# Patient Record
Sex: FEMALE | Race: BLACK OR AFRICAN AMERICAN
[De-identification: names, ages, dates, MRNs, and addresses within clinical notes are randomized per-mention and may not be internally consistent; named-entity substitution may affect disease eponyms.]

---

## 2017-01-24 ENCOUNTER — HOSPITAL ENCOUNTER (EMERGENCY)
Dept: HOSPITAL 62 - ER | Age: 32
Discharge: LEFT BEFORE DISCHARGE | End: 2017-01-24
Payer: MEDICAID

## 2017-01-24 VITALS — DIASTOLIC BLOOD PRESSURE: 110 MMHG | SYSTOLIC BLOOD PRESSURE: 164 MMHG

## 2017-01-24 DIAGNOSIS — R19.7: ICD-10-CM

## 2017-01-24 DIAGNOSIS — R51: ICD-10-CM

## 2017-01-24 DIAGNOSIS — R50.9: ICD-10-CM

## 2017-01-24 DIAGNOSIS — Z53.9: Primary | ICD-10-CM

## 2017-01-24 DIAGNOSIS — R11.10: ICD-10-CM

## 2017-01-24 LAB
ALBUMIN SERPL-MCNC: 5.2 G/DL (ref 3.5–5)
ALP SERPL-CCNC: 57 U/L (ref 38–126)
ALT SERPL-CCNC: 37 U/L (ref 9–52)
ANION GAP SERPL CALC-SCNC: 15 MMOL/L (ref 5–19)
APPEARANCE UR: (no result)
AST SERPL-CCNC: 26 U/L (ref 14–36)
BASE EXCESS BLDV CALC-SCNC: 0.8 MMOL/L
BASOPHILS # BLD AUTO: 0 10^3/UL (ref 0–0.2)
BASOPHILS NFR BLD AUTO: 0.4 % (ref 0–2)
BILIRUB DIRECT SERPL-MCNC: 0 MG/DL (ref 0–0.3)
BILIRUB SERPL-MCNC: 0.8 MG/DL (ref 0.2–1.3)
BILIRUB UR QL STRIP: NEGATIVE
BUN SERPL-MCNC: 9 MG/DL (ref 7–20)
CALCIUM: 10 MG/DL (ref 8.4–10.2)
CHLORIDE SERPL-SCNC: 98 MMOL/L (ref 98–107)
CO2 SERPL-SCNC: 27 MMOL/L (ref 22–30)
CREAT SERPL-MCNC: 1.07 MG/DL (ref 0.52–1.25)
EOSINOPHIL # BLD AUTO: 0 10^3/UL (ref 0–0.6)
EOSINOPHIL NFR BLD AUTO: 0 % (ref 0–6)
ERYTHROCYTE [DISTWIDTH] IN BLOOD BY AUTOMATED COUNT: 12.9 % (ref 11.5–14)
GLUCOSE SERPL-MCNC: 86 MG/DL (ref 75–110)
GLUCOSE UR STRIP-MCNC: NEGATIVE MG/DL
HCO3 BLDV-SCNC: 26.4 MMOL/L (ref 20–32)
HCT VFR BLD CALC: 45.2 % (ref 36–47)
HGB BLD-MCNC: 15 G/DL (ref 12–15.5)
HGB HCT DIFFERENCE: -0.2
KETONES UR STRIP-MCNC: (no result) MG/DL
LYMPHOCYTES # BLD AUTO: 0.7 10^3/UL (ref 0.5–4.7)
LYMPHOCYTES NFR BLD AUTO: 12.4 % (ref 13–45)
MCH RBC QN AUTO: 31.1 PG (ref 27–33.4)
MCHC RBC AUTO-ENTMCNC: 33.2 G/DL (ref 32–36)
MCV RBC AUTO: 94 FL (ref 80–97)
MONOCYTES # BLD AUTO: 0.8 10^3/UL (ref 0.1–1.4)
MONOCYTES NFR BLD AUTO: 13.6 % (ref 3–13)
NEUTROPHILS # BLD AUTO: 4.1 10^3/UL (ref 1.7–8.2)
NEUTS SEG NFR BLD AUTO: 73.6 % (ref 42–78)
NITRITE UR QL STRIP: NEGATIVE
PCO2 BLDV: 45.6 MMHG (ref 35–63)
PH BLDV: 7.38 [PH] (ref 7.3–7.42)
PH UR STRIP: 5 [PH] (ref 5–9)
POTASSIUM SERPL-SCNC: 4.1 MMOL/L (ref 3.6–5)
PROT SERPL-MCNC: 8.9 G/DL (ref 6.3–8.2)
PROT UR STRIP-MCNC: 30 MG/DL
PROTHROMBIN TIME: 12.5 SEC (ref 11.4–15.4)
RBC # BLD AUTO: 4.82 10^6/UL (ref 3.72–5.28)
SODIUM SERPL-SCNC: 140.1 MMOL/L (ref 137–145)
SP GR UR STRIP: 1.03
UROBILINOGEN UR-MCNC: NEGATIVE MG/DL (ref ?–2)
WBC # BLD AUTO: 5.6 10^3/UL (ref 4–10.5)

## 2017-01-24 PROCEDURE — 36415 COLL VENOUS BLD VENIPUNCTURE: CPT

## 2017-01-24 PROCEDURE — 82962 GLUCOSE BLOOD TEST: CPT

## 2017-01-24 PROCEDURE — 93010 ELECTROCARDIOGRAM REPORT: CPT

## 2017-01-24 PROCEDURE — 71020: CPT

## 2017-01-24 PROCEDURE — 99281 EMR DPT VST MAYX REQ PHY/QHP: CPT

## 2017-01-24 PROCEDURE — 93005 ELECTROCARDIOGRAM TRACING: CPT

## 2017-01-24 PROCEDURE — 85610 PROTHROMBIN TIME: CPT

## 2017-01-24 PROCEDURE — 81001 URINALYSIS AUTO W/SCOPE: CPT

## 2017-01-24 PROCEDURE — 82803 BLOOD GASES ANY COMBINATION: CPT

## 2017-01-24 PROCEDURE — 87086 URINE CULTURE/COLONY COUNT: CPT

## 2017-01-24 PROCEDURE — 87040 BLOOD CULTURE FOR BACTERIA: CPT

## 2017-01-24 PROCEDURE — 80053 COMPREHEN METABOLIC PANEL: CPT

## 2017-01-24 PROCEDURE — 85025 COMPLETE CBC W/AUTO DIFF WBC: CPT

## 2017-01-24 PROCEDURE — 83605 ASSAY OF LACTIC ACID: CPT

## 2017-01-24 PROCEDURE — 84703 CHORIONIC GONADOTROPIN ASSAY: CPT

## 2017-01-24 NOTE — ER DOCUMENT REPORT
ED Medical Screen (RME)





- General


Chief Complaint: Fever


Stated Complaint: FEVER,VOMITING,DIARRHEA,HEADACHES


Time seen by provider: 18:57


Mode of Arrival: Ambulatory


Information source: Patient


Notes: 


31-year-old female presents to ED for nausea vomiting diarrhea fever bodyaches 

headache and back pain since Friday.  States she laid down most the time Friday 

night and Saturday all day.  Patient has a elevated blood pressure states she 

has a history of high blood pressure.  She also has a temp of 102.6 in RME














I have greeted and performed a rapid initial assessment of this patient.  A 

comprehensive ED assessment and evaluation of the patient, analysis of test 

results and completion of medical decision making process will be conducted by 

an additional ED providers.


TRAVEL OUTSIDE OF THE [place] IN LAST 30 DAYS: No





- Related Data


Allergies/Adverse Reactions: 


 





latex Allergy (Verified 05/24/16 12:36)


 


Penicillins Allergy (Verified 05/24/16 12:36)


 











Past Medical History





- Past Medical History


Cardiac Medical History: Reports: Hx Hypertension


Neurological Medical History: Reports: Hx Seizures - last 2 weeks





- Immunizations


Immunizations up to date: Yes


Hx Diphtheria, Pertussis, Tetanus Vaccination: Yes

## 2017-01-25 NOTE — EKG REPORT
SEVERITY:- BORDERLINE ECG -

SINUS RHYTHM

PROBABLE LEFT ATRIAL ABNORMALITY

:

Confirmed by: Loly Nice MD 25-Jan-2017 14:56:39

## 2017-06-25 ENCOUNTER — HOSPITAL ENCOUNTER (EMERGENCY)
Dept: HOSPITAL 62 - ER | Age: 32
Discharge: HOME | End: 2017-06-25
Payer: MEDICAID

## 2017-06-25 VITALS — DIASTOLIC BLOOD PRESSURE: 105 MMHG | SYSTOLIC BLOOD PRESSURE: 149 MMHG

## 2017-06-25 DIAGNOSIS — M54.89: ICD-10-CM

## 2017-06-25 DIAGNOSIS — T14.8: ICD-10-CM

## 2017-06-25 DIAGNOSIS — O99.89: ICD-10-CM

## 2017-06-25 DIAGNOSIS — O9A.211: Primary | ICD-10-CM

## 2017-06-25 DIAGNOSIS — O99.331: ICD-10-CM

## 2017-06-25 DIAGNOSIS — O26.891: ICD-10-CM

## 2017-06-25 DIAGNOSIS — Z3A.09: ICD-10-CM

## 2017-06-25 DIAGNOSIS — O16.1: ICD-10-CM

## 2017-06-25 DIAGNOSIS — R10.30: ICD-10-CM

## 2017-06-25 LAB
APPEARANCE UR: (no result)
BILIRUB UR QL STRIP: NEGATIVE
GLUCOSE UR STRIP-MCNC: NEGATIVE MG/DL
KETONES UR STRIP-MCNC: NEGATIVE MG/DL
NITRITE UR QL STRIP: NEGATIVE
PH UR STRIP: 6 [PH] (ref 5–9)
PROT UR STRIP-MCNC: NEGATIVE MG/DL
SP GR UR STRIP: 1.03
UROBILINOGEN UR-MCNC: NEGATIVE MG/DL (ref ?–2)

## 2017-06-25 PROCEDURE — 81025 URINE PREGNANCY TEST: CPT

## 2017-06-25 PROCEDURE — 99283 EMERGENCY DEPT VISIT LOW MDM: CPT

## 2017-06-25 PROCEDURE — 81001 URINALYSIS AUTO W/SCOPE: CPT

## 2017-06-25 NOTE — ER DOCUMENT REPORT
HPI





- HPI


Pain Level: 3


Context: 


33 yo female presents to ED with 2 complaints.  


1.pain to right upper back x 2 days.  has had similar pain intermittantly x 6-8 

mos.  no trauma, + increased stress recently.  no radiculpathy, paresthesia, 

fever. 


2.  lower abdominal pain x 1 month.  dull, intermittant.  denies dysuria, 

vaginal pain or discharge.  no fever 


Associated Symptoms: Nausea


Exacerbated by: Movement


Relieved by: Denies


Similar symptoms previously: Yes





- ROS


Systems Reviewed and Negative: Yes All other systems reviewed and negative





- DERM


Skin Color: Normal





Past Medical History





- General


Information source: Patient





- Social History


Smoking Status: Current Every Day Smoker


Frequency of alcohol use: None


Lives with: Family


Family History: Arthritis, CAD, CVA, DM, Hyperlipidemia, Hypertension, 

Malignancy.  denies: COPD, Thyroid Disfunction


Patient has suicidal ideation: No


Patient has homicidal ideation: No





- Past Medical History


Cardiac Medical History: Reports: Hx Hypertension


Neurological Medical History: Reports: Hx Seizures - last 2 weeks


Renal/ Medical History: Denies: Hx Peritoneal Dialysis





- Immunizations


Immunizations up to date: Yes


Hx Diphtheria, Pertussis, Tetanus Vaccination: Yes





Vertical Provider Document





- CONSTITUTIONAL


Agree With Documented VS: Yes


Exam Limitations: No Limitations





- INFECTION CONTROL


TRAVEL OUTSIDE OF THE [place] IN LAST 30 DAYS: No





- HEENT


HEENT: Atraumatic, Normal ENT Exam, PERRLA





- NECK


Neck: Supple, Other - + right trapezius tenderness





- RESPIRATORY


Respiratory: Breath Sounds Normal, No Respiratory Distress


O2 Sat by Pulse Oximetry: 98





- CARDIOVASCULAR


Cardiovascular: Regular Rate, Regular Rhythm





- GI/ABDOMEN


Gastrointestinal: Abdomen Soft, Abdomen Non-Tender, Normal Bowel Sounds.  

negative: Abdominal Guarding, Abdominal Rebound





- BACK


Notes: 


+ focal tenderness right periscapular area, + muscle cord palpated.





- MUSCULOSKELETAL/EXTREMETIES


Musculoskeletal/Extremeties: MAEW, FROM, Non-Tender





- NEURO


Level of Consciousness: Awake, Alert, Appropriate





Course





- Re-evaluation


Re-evalutation: 





06/25/17 17:43


pregnancy test is postitve


pt aware of results





BP is noted to be elevated today.  + hx/o HTN.  denies any chest pain, 

shortness of breath, headache or dizziness.





- Vital Signs


Vital signs: 


 











Temp Pulse Resp BP Pulse Ox


 


 98.1 F   92   16   149/105 H  98 


 


 06/25/17 13:55  06/25/17 13:55  06/25/17 13:55  06/25/17 13:55  06/25/17 13:55














Discharge





- Discharge


Clinical Impression: 


 Muscle strain, Elevated blood pressure reading





Pregnancy


Qualifiers:


 Weeks of gestation: 9 weeks Qualified Code(s): Z3A.09 - 9 weeks gestation of 

pregnancy





Condition: Stable


Disposition: HOME, SELF-CARE


Instructions:  Ice Packs ([facility]), Warm Packs ([facility]), Muscle Strain ([facility]), 

Pregnancy ([facility])


Additional Instructions: 


Your pregnancy test is postive


please follow up with your primary care for an OB referral for prenatal care


rest and hydrate





Your back pain is coming from a muscle strain


recommend heat, massage and gentle stretches as demonstrated


Forms:  Elevated Blood Pressure

## 2017-07-06 ENCOUNTER — HOSPITAL ENCOUNTER (EMERGENCY)
Dept: HOSPITAL 62 - ER | Age: 32
Discharge: LEFT BEFORE DISCHARGE | End: 2017-07-06
Payer: MEDICAID

## 2017-07-06 VITALS — DIASTOLIC BLOOD PRESSURE: 101 MMHG | SYSTOLIC BLOOD PRESSURE: 187 MMHG

## 2017-07-06 DIAGNOSIS — Z53.9: Primary | ICD-10-CM

## 2017-07-06 DIAGNOSIS — R53.1: ICD-10-CM

## 2017-11-20 ENCOUNTER — HOSPITAL ENCOUNTER (OUTPATIENT)
Dept: HOSPITAL 62 - LC | Age: 32
Discharge: HOME | End: 2017-11-20
Attending: OBSTETRICS & GYNECOLOGY
Payer: MEDICAID

## 2017-11-20 DIAGNOSIS — Z3A.24: ICD-10-CM

## 2017-11-20 DIAGNOSIS — O11.2: Primary | ICD-10-CM

## 2017-11-20 LAB
ALBUMIN SERPL-MCNC: 3.3 G/DL (ref 3.5–5)
ALP SERPL-CCNC: 38 U/L (ref 38–126)
ALT SERPL-CCNC: 25 U/L (ref 9–52)
ANION GAP SERPL CALC-SCNC: 13 MMOL/L (ref 5–19)
APPEARANCE UR: CLEAR
AST SERPL-CCNC: 13 U/L (ref 14–36)
BARBITURATES UR QL SCN: NEGATIVE
BASOPHILS # BLD AUTO: 0 10^3/UL (ref 0–0.2)
BASOPHILS NFR BLD AUTO: 0.4 % (ref 0–2)
BILIRUB DIRECT SERPL-MCNC: 0.2 MG/DL (ref 0–0.4)
BILIRUB SERPL-MCNC: 0.4 MG/DL (ref 0.2–1.3)
BILIRUB UR QL STRIP: NEGATIVE
BUN SERPL-MCNC: 5 MG/DL (ref 7–20)
CALCIUM: 8.7 MG/DL (ref 8.4–10.2)
CHLORIDE SERPL-SCNC: 106 MMOL/L (ref 98–107)
CO2 SERPL-SCNC: 21 MMOL/L (ref 22–30)
CREAT SERPL-MCNC: 0.69 MG/DL (ref 0.52–1.25)
CREAT UR-MCNC: 269.2 MG/DL (ref 16–327)
EOSINOPHIL # BLD AUTO: 0 10^3/UL (ref 0–0.6)
EOSINOPHIL NFR BLD AUTO: 0.4 % (ref 0–6)
ERYTHROCYTE [DISTWIDTH] IN BLOOD BY AUTOMATED COUNT: 12.6 % (ref 11.5–14)
GLUCOSE SERPL-MCNC: 118 MG/DL (ref 75–110)
GLUCOSE UR STRIP-MCNC: NEGATIVE MG/DL
HCT VFR BLD CALC: 31.8 % (ref 36–47)
HGB BLD-MCNC: 10.8 G/DL (ref 12–15.5)
HGB HCT DIFFERENCE: 0.6
KETONES UR STRIP-MCNC: NEGATIVE MG/DL
LDH1 SERPL-CCNC: 316 U/L (ref 313–618)
LYMPHOCYTES # BLD AUTO: 1.1 10^3/UL (ref 0.5–4.7)
LYMPHOCYTES NFR BLD AUTO: 16.6 % (ref 13–45)
MCH RBC QN AUTO: 31.2 PG (ref 27–33.4)
MCHC RBC AUTO-ENTMCNC: 33.9 G/DL (ref 32–36)
MCV RBC AUTO: 92 FL (ref 80–97)
METHADONE UR QL SCN: NEGATIVE
MONOCYTES # BLD AUTO: 0.5 10^3/UL (ref 0.1–1.4)
MONOCYTES NFR BLD AUTO: 7.7 % (ref 3–13)
NEUTROPHILS # BLD AUTO: 4.9 10^3/UL (ref 1.7–8.2)
NEUTS SEG NFR BLD AUTO: 74.9 % (ref 42–78)
NITRITE UR QL STRIP: NEGATIVE
PCP UR QL SCN: NEGATIVE
PH UR STRIP: 5 [PH] (ref 5–9)
POTASSIUM SERPL-SCNC: 3.4 MMOL/L (ref 3.6–5)
PROT SERPL-MCNC: 6 G/DL (ref 6.3–8.2)
PROT UR STRIP-MCNC: 9 MG/DL (ref ?–12)
PROT UR STRIP-MCNC: NEGATIVE MG/DL
RBC # BLD AUTO: 3.46 10^6/UL (ref 3.72–5.28)
SODIUM SERPL-SCNC: 139.7 MMOL/L (ref 137–145)
SP GR UR STRIP: 1.02
URATE SERPL-MCNC: 4.3 MG/DL (ref 2.5–6.2)
URINE OPIATES LOW: NEGATIVE
UROBILINOGEN UR-MCNC: NEGATIVE MG/DL (ref ?–2)
WBC # BLD AUTO: 6.5 10^3/UL (ref 4–10.5)

## 2017-11-20 PROCEDURE — 36415 COLL VENOUS BLD VENIPUNCTURE: CPT

## 2017-11-20 PROCEDURE — 82570 ASSAY OF URINE CREATININE: CPT

## 2017-11-20 PROCEDURE — 80307 DRUG TEST PRSMV CHEM ANLYZR: CPT

## 2017-11-20 PROCEDURE — 80053 COMPREHEN METABOLIC PANEL: CPT

## 2017-11-20 PROCEDURE — 83615 LACTATE (LD) (LDH) ENZYME: CPT

## 2017-11-20 PROCEDURE — 85025 COMPLETE CBC W/AUTO DIFF WBC: CPT

## 2017-11-20 PROCEDURE — 84156 ASSAY OF PROTEIN URINE: CPT

## 2017-11-20 PROCEDURE — 84550 ASSAY OF BLOOD/URIC ACID: CPT

## 2017-11-20 PROCEDURE — 81001 URINALYSIS AUTO W/SCOPE: CPT

## 2017-12-14 ENCOUNTER — HOSPITAL ENCOUNTER (OUTPATIENT)
Dept: HOSPITAL 62 - LC | Age: 32
LOS: 1 days | Discharge: HOME | End: 2017-12-15
Attending: OBSTETRICS & GYNECOLOGY
Payer: MEDICAID

## 2017-12-14 DIAGNOSIS — Z3A.28: ICD-10-CM

## 2017-12-14 DIAGNOSIS — O36.8130: Primary | ICD-10-CM

## 2017-12-14 PROCEDURE — 4A1HXCZ MONITORING OF PRODUCTS OF CONCEPTION, CARDIAC RATE, EXTERNAL APPROACH: ICD-10-PCS | Performed by: OBSTETRICS & GYNECOLOGY

## 2017-12-14 PROCEDURE — 80307 DRUG TEST PRSMV CHEM ANLYZR: CPT

## 2017-12-14 PROCEDURE — 81001 URINALYSIS AUTO W/SCOPE: CPT

## 2017-12-14 PROCEDURE — 59899 UNLISTED PX MAT CARE&DLVR: CPT

## 2017-12-15 ENCOUNTER — HOSPITAL ENCOUNTER (EMERGENCY)
Dept: HOSPITAL 62 - ER | Age: 32
Discharge: HOME | End: 2017-12-15
Payer: MEDICAID

## 2017-12-15 VITALS — SYSTOLIC BLOOD PRESSURE: 170 MMHG | DIASTOLIC BLOOD PRESSURE: 106 MMHG

## 2017-12-15 DIAGNOSIS — Z87.891: ICD-10-CM

## 2017-12-15 DIAGNOSIS — K08.89: Primary | ICD-10-CM

## 2017-12-15 DIAGNOSIS — Z3A.28: ICD-10-CM

## 2017-12-15 DIAGNOSIS — K02.9: ICD-10-CM

## 2017-12-15 LAB
APPEARANCE UR: CLEAR
BARBITURATES UR QL SCN: NEGATIVE
BILIRUB UR QL STRIP: NEGATIVE
GLUCOSE UR STRIP-MCNC: NEGATIVE MG/DL
KETONES UR STRIP-MCNC: NEGATIVE MG/DL
METHADONE UR QL SCN: NEGATIVE
NITRITE UR QL STRIP: NEGATIVE
PCP UR QL SCN: NEGATIVE
PH UR STRIP: 6 [PH] (ref 5–9)
PROT UR STRIP-MCNC: NEGATIVE MG/DL
SP GR UR STRIP: 1.01
URINE OPIATES LOW: (no result)
UROBILINOGEN UR-MCNC: NEGATIVE MG/DL (ref ?–2)

## 2017-12-15 PROCEDURE — 99282 EMERGENCY DEPT VISIT SF MDM: CPT

## 2017-12-15 NOTE — ER DOCUMENT REPORT
ED General





- General


Chief Complaint: Toothache


Stated Complaint: TOOTHACHE


Time Seen by Provider: 12/15/17 01:42


Mode of Arrival: Ambulatory


Information source: Patient


TRAVEL OUTSIDE OF THE [place] IN LAST 30 DAYS: No





- HPI


Onset: Other - months. Pt. has been referred to a surgeon for tooth extraction 

and has follow up next month.


Onset/Duration: Waxing and waning


Quality of pain: Dull, Throbbing


Severity: Severe


Associated symptoms: None


Exacerbated by: Food


Relieved by: Denies, Other - took motrin and tylenol with codeine without relief





- Related Data


Allergies/Adverse Reactions: 


 





Penicillins Allergy (Severe, Verified 12/14/17 23:21)


 Anaphylaxis


latex Allergy (Intermediate, Verified 12/14/17 23:21)


 Hives











Past Medical History





- General


Information source: Patient





- Social History


Smoking Status: Former Smoker


Frequency of alcohol use: None


Drug Abuse: None


Lives with: Family


Family History: Arthritis, CAD, CVA, DM, Hyperlipidemia, Hypertension, 

Malignancy.  denies: COPD, Thyroid Disfunction


Patient has suicidal ideation: No


Patient has homicidal ideation: No





- Medical History


Notes: 





27 weeks pregnant





- Past Medical History


Cardiac Medical History: Reports: Hx Hypertension


Pulmonary Medical History: Reports: None


EENT Medical History: Reports: None


Neurological Medical History: Reports: Hx Seizures - last 2 weeks


Endocrine Medical History: Reports: None


Renal/ Medical History: Denies: Hx Peritoneal Dialysis


Malignancy Medical History: Reports: None


GI Medical History: Reports: None


Musculoskeltal Medical History: Reports None


Skin Medical History: Reports None


Psychiatric Medical History: Reports: None


Traumatic Medical History: Reports: None


Infectious Medical History: Reports: None


Past Surgical History: Reports: None





- Immunizations


Immunizations up to date: Yes


Hx Diphtheria, Pertussis, Tetanus Vaccination: Yes





Review of Systems





- Review of Systems


EENT: No symptoms reported


Cardiovascular: No symptoms reported


Respiratory: No symptoms reported


Gastrointestinal: No symptoms reported


Genitourinary: No symptoms reported


Female Genitourinary: No symptoms reported


Musculoskeletal: No symptoms reported


Neurological/Psychological: No symptoms reported





Physical Exam





- Vital signs


Vitals: 


 











Temp Pulse Resp BP Pulse Ox


 


 98.6 F   84   18   186/109 H  98 


 


 12/15/17 01:02  12/15/17 01:02  12/15/17 01:02  12/15/17 01:02  12/15/17 01:02











Interpretation: Hypertensive





- Notes


Notes: 





PHYSICAL EXAMINATION:





GENERAL: Well-appearing, well-nourished in mild distress due to tooth pain..





HEAD: Atraumatic, normocephalic.





EYES: Pupils equal round and reactive to light, extraocular movements intact, 

conjunctiva are normal.





ENT: Nares patent, oropharynx clear without exudates.  Moist mucous membranes. 

right lower posterior molar with decay and erythematous surrounding gingiva.





NECK: Normal range of motion, supple without lymphadenopathy





LUNGS: Breath sounds clear to auscultation bilaterally and equal.  No wheezes 

rales or rhonchi.





HEART: Regular rate and rhythm without murmurs





ABDOMEN: Gravid. Nontender.





Female : deferred





Musculoskeletal: Normal range of motion, no pitting or edema.  No cyanosis.





NEUROLOGICAL: Cranial nerves grossly intact.  Normal speech, normal gait.  

Normal sensory, motor exams





PSYCH: Normal mood, normal affect.





SKIN: Warm, Dry, normal turgor, no rashes or lesions noted.





Course





- Re-evaluation


Re-evalutation: 





12/15/17 02:00


Pt. was seen in OB and evaluated prior to being seen in ED.





- Vital Signs


Vital signs: 


 











Temp Pulse Resp BP Pulse Ox


 


 98.6 F   84   18   185/109 H  98 


 


 12/15/17 01:08  12/15/17 01:08  12/15/17 01:02  12/15/17 01:08  12/15/17 01:08














Discharge





- Discharge


Clinical Impression: 


 Toothache, Tooth decay, Pregnancy





Condition: Stable


Disposition: HOME, SELF-CARE


Additional Instructions: 


Call your OB tomorrow to discuss pain control options with her.  Also call your 

dentist for follow-up, see if he can see you sooner than your appointment which 

is next month.  Return to the emergency department if you have any fevers.  I 

am not going to place her on antibiotics since you have just finished an 

extended course.  Please be careful with over-the-counter medications as we 

discussed.  Orajel contains benzocaine and that can be harmful to your baby.


Referrals: 


JESÚS JENNINGS MD [Primary Care Provider] - Follow up as needed

## 2018-01-15 ENCOUNTER — HOSPITAL ENCOUNTER (OUTPATIENT)
Dept: HOSPITAL 62 - LC | Age: 33
Discharge: HOME | End: 2018-01-15
Attending: OBSTETRICS & GYNECOLOGY
Payer: MEDICAID

## 2018-01-15 DIAGNOSIS — Z3A.32: ICD-10-CM

## 2018-01-15 DIAGNOSIS — O16.3: ICD-10-CM

## 2018-01-15 DIAGNOSIS — O47.03: Primary | ICD-10-CM

## 2018-01-15 LAB
ADD MANUAL DIFF: NO
ALBUMIN SERPL-MCNC: 3.3 G/DL (ref 3.5–5)
ALP SERPL-CCNC: 55 U/L (ref 38–126)
ALT SERPL-CCNC: 14 U/L (ref 9–52)
ANION GAP SERPL CALC-SCNC: 10 MMOL/L (ref 5–19)
APPEARANCE UR: (no result)
APTT PPP: YELLOW S
AST SERPL-CCNC: 15 U/L (ref 14–36)
BARBITURATES UR QL SCN: NEGATIVE
BASOPHILS # BLD AUTO: 0 10^3/UL (ref 0–0.2)
BASOPHILS NFR BLD AUTO: 0.3 % (ref 0–2)
BILIRUB DIRECT SERPL-MCNC: 0.2 MG/DL (ref 0–0.4)
BILIRUB SERPL-MCNC: 0.4 MG/DL (ref 0.2–1.3)
BILIRUB UR QL STRIP: NEGATIVE
BUN SERPL-MCNC: 7 MG/DL (ref 7–20)
CALCIUM: 9.2 MG/DL (ref 8.4–10.2)
CHLORIDE SERPL-SCNC: 107 MMOL/L (ref 98–107)
CO2 SERPL-SCNC: 19 MMOL/L (ref 22–30)
CREAT UR-MCNC: 95.7 MG/DL (ref 16–327)
EOSINOPHIL # BLD AUTO: 0 10^3/UL (ref 0–0.6)
EOSINOPHIL NFR BLD AUTO: 0.4 % (ref 0–6)
ERYTHROCYTE [DISTWIDTH] IN BLOOD BY AUTOMATED COUNT: 13.5 % (ref 11.5–14)
GLUCOSE SERPL-MCNC: 107 MG/DL (ref 75–110)
GLUCOSE UR STRIP-MCNC: NEGATIVE MG/DL
HCT VFR BLD CALC: 32.2 % (ref 36–47)
HGB BLD-MCNC: 11.1 G/DL (ref 12–15.5)
KETONES UR STRIP-MCNC: NEGATIVE MG/DL
LDH1 SERPL-CCNC: 358 U/L (ref 313–618)
LYMPHOCYTES # BLD AUTO: 1.3 10^3/UL (ref 0.5–4.7)
LYMPHOCYTES NFR BLD AUTO: 21.5 % (ref 13–45)
MCH RBC QN AUTO: 31.4 PG (ref 27–33.4)
MCHC RBC AUTO-ENTMCNC: 34.4 G/DL (ref 32–36)
MCV RBC AUTO: 91 FL (ref 80–97)
METHADONE UR QL SCN: NEGATIVE
MONOCYTES # BLD AUTO: 0.5 10^3/UL (ref 0.1–1.4)
MONOCYTES NFR BLD AUTO: 9 % (ref 3–13)
NEUTROPHILS # BLD AUTO: 4.1 10^3/UL (ref 1.7–8.2)
NEUTS SEG NFR BLD AUTO: 68.8 % (ref 42–78)
NITRITE UR QL STRIP: NEGATIVE
PCP UR QL SCN: NEGATIVE
PH UR STRIP: 6 [PH] (ref 5–9)
PLATELET # BLD: 191 10^3/UL (ref 150–450)
POTASSIUM SERPL-SCNC: 3.9 MMOL/L (ref 3.6–5)
PROT SERPL-MCNC: 6.3 G/DL (ref 6.3–8.2)
PROT UR STRIP-MCNC: 14 MG/DL (ref ?–12)
PROT UR STRIP-MCNC: NEGATIVE MG/DL
RBC # BLD AUTO: 3.53 10^6/UL (ref 3.72–5.28)
SODIUM SERPL-SCNC: 135.8 MMOL/L (ref 137–145)
SP GR UR STRIP: 1.01
TOTAL CELLS COUNTED % (AUTO): 100 %
UR PRO/CREAT RATIO RESULT: 0.1 MG/MG (ref 0–0.2)
URATE SERPL-MCNC: 4.7 MG/DL (ref 2.5–6.2)
URINE AMPHETAMINES SCREEN: NEGATIVE
URINE BENZODIAZEPINES SCREEN: NEGATIVE
URINE COCAINE SCREEN: NEGATIVE
URINE MARIJUANA (THC) SCREEN: NEGATIVE
UROBILINOGEN UR-MCNC: 2 MG/DL (ref ?–2)
WBC # BLD AUTO: 5.9 10^3/UL (ref 4–10.5)

## 2018-01-15 PROCEDURE — 4A1HXCZ MONITORING OF PRODUCTS OF CONCEPTION, CARDIAC RATE, EXTERNAL APPROACH: ICD-10-PCS | Performed by: OBSTETRICS & GYNECOLOGY

## 2018-01-15 PROCEDURE — 36415 COLL VENOUS BLD VENIPUNCTURE: CPT

## 2018-01-15 PROCEDURE — 82570 ASSAY OF URINE CREATININE: CPT

## 2018-01-15 PROCEDURE — 81001 URINALYSIS AUTO W/SCOPE: CPT

## 2018-01-15 PROCEDURE — 85025 COMPLETE CBC W/AUTO DIFF WBC: CPT

## 2018-01-15 PROCEDURE — 80307 DRUG TEST PRSMV CHEM ANLYZR: CPT

## 2018-01-15 PROCEDURE — 80053 COMPREHEN METABOLIC PANEL: CPT

## 2018-01-15 PROCEDURE — 83615 LACTATE (LD) (LDH) ENZYME: CPT

## 2018-01-15 PROCEDURE — 84550 ASSAY OF BLOOD/URIC ACID: CPT

## 2018-01-15 PROCEDURE — 59025 FETAL NON-STRESS TEST: CPT

## 2018-01-15 PROCEDURE — 84156 ASSAY OF PROTEIN URINE: CPT

## 2018-01-15 NOTE — NON STRESS TEST REPORT
=================================================================

Non Stress Test

=================================================================

Datetime Report Generated by CPN: 01/15/2018 13:38

   

   

=================================================================

DEMOGRAPHIC

=================================================================

   

EGA NST:  32.4

   

=================================================================

INDICATION

=================================================================

   

Indication for Study:  Ordered by Provider

   

=================================================================

VITAL SIGNS

=================================================================

   

Temperature - NST:  97.6

Pulse - NST:  90

RESP - NST:  14

NBPSYS NST:  140

NBPDIA NST:  86

   

=================================================================

MONITORING

=================================================================

   

Monitor Explained:  Monitor Explained; Test Explained; Patient

   Verbalized Understanding

Time on Monitor:  01/15/2018 11:34

Time off Monitor:  01/15/2018 12:20

NST Duration:  46

   

=================================================================

NST INTERVENTIONS

=================================================================

   

NST Interventions:  PO Hydration

Physician Notified NST:  A Savage CNM

BABY A:  [id number]

   

=================================================================

BABY A

=================================================================

   

Fetal Movement :  Present

Contraction Frequency :  none

FHR Baseline :  130

Accelerations :  15X15

Decelerations :  None

Variability :  Moderate 6-25bpm

NST Review:  Meets Criteria for Reactive NST

NST Review and Verified By :  PAOLA Arevalo RN

NST Results:  Reactive

   

=================================================================

NST REPORT

=================================================================

   

Report Trigger:  Send Report

## 2018-01-22 ENCOUNTER — HOSPITAL ENCOUNTER (OUTPATIENT)
Dept: HOSPITAL 62 - LC | Age: 33
Discharge: HOME | End: 2018-01-22
Attending: OBSTETRICS & GYNECOLOGY
Payer: MEDICAID

## 2018-01-22 DIAGNOSIS — Z3A.33: ICD-10-CM

## 2018-01-22 DIAGNOSIS — O10.913: Primary | ICD-10-CM

## 2018-01-22 LAB
ADD MANUAL DIFF: NO
ALBUMIN SERPL-MCNC: 3.4 G/DL (ref 3.5–5)
ALP SERPL-CCNC: 60 U/L (ref 38–126)
ALT SERPL-CCNC: 16 U/L (ref 9–52)
ANION GAP SERPL CALC-SCNC: 8 MMOL/L (ref 5–19)
APPEARANCE UR: (no result)
APTT PPP: YELLOW S
AST SERPL-CCNC: 14 U/L (ref 14–36)
BARBITURATES UR QL SCN: NEGATIVE
BASOPHILS # BLD AUTO: 0 10^3/UL (ref 0–0.2)
BASOPHILS NFR BLD AUTO: 0.3 % (ref 0–2)
BILIRUB DIRECT SERPL-MCNC: 0.1 MG/DL (ref 0–0.4)
BILIRUB SERPL-MCNC: 0.4 MG/DL (ref 0.2–1.3)
BILIRUB UR QL STRIP: NEGATIVE
BUN SERPL-MCNC: 5 MG/DL (ref 7–20)
CALCIUM: 9.4 MG/DL (ref 8.4–10.2)
CHLORIDE SERPL-SCNC: 107 MMOL/L (ref 98–107)
CO2 SERPL-SCNC: 21 MMOL/L (ref 22–30)
CREAT UR-MCNC: 136.3 MG/DL (ref 16–327)
EOSINOPHIL # BLD AUTO: 0 10^3/UL (ref 0–0.6)
EOSINOPHIL NFR BLD AUTO: 0.6 % (ref 0–6)
ERYTHROCYTE [DISTWIDTH] IN BLOOD BY AUTOMATED COUNT: 13.3 % (ref 11.5–14)
GLUCOSE SERPL-MCNC: 85 MG/DL (ref 75–110)
GLUCOSE UR STRIP-MCNC: NEGATIVE MG/DL
HCT VFR BLD CALC: 33.1 % (ref 36–47)
HGB BLD-MCNC: 11.3 G/DL (ref 12–15.5)
KETONES UR STRIP-MCNC: NEGATIVE MG/DL
LDH1 SERPL-CCNC: 346 U/L (ref 313–618)
LYMPHOCYTES # BLD AUTO: 1.2 10^3/UL (ref 0.5–4.7)
LYMPHOCYTES NFR BLD AUTO: 21 % (ref 13–45)
MCH RBC QN AUTO: 30.9 PG (ref 27–33.4)
MCHC RBC AUTO-ENTMCNC: 34.1 G/DL (ref 32–36)
MCV RBC AUTO: 90 FL (ref 80–97)
METHADONE UR QL SCN: NEGATIVE
MONOCYTES # BLD AUTO: 0.5 10^3/UL (ref 0.1–1.4)
MONOCYTES NFR BLD AUTO: 9.4 % (ref 3–13)
NEUTROPHILS # BLD AUTO: 4 10^3/UL (ref 1.7–8.2)
NEUTS SEG NFR BLD AUTO: 68.7 % (ref 42–78)
NITRITE UR QL STRIP: NEGATIVE
PCP UR QL SCN: NEGATIVE
PH UR STRIP: 7 [PH] (ref 5–9)
PLATELET # BLD: 181 10^3/UL (ref 150–450)
POTASSIUM SERPL-SCNC: 3.7 MMOL/L (ref 3.6–5)
PROT SERPL-MCNC: 6 G/DL (ref 6.3–8.2)
PROT UR STRIP-MCNC: 24.8 MG/DL (ref ?–12)
PROT UR STRIP-MCNC: NEGATIVE MG/DL
RBC # BLD AUTO: 3.66 10^6/UL (ref 3.72–5.28)
SODIUM SERPL-SCNC: 135.7 MMOL/L (ref 137–145)
SP GR UR STRIP: 1.01
TOTAL CELLS COUNTED % (AUTO): 100 %
UR PRO/CREAT RATIO RESULT: 0.2 MG/MG (ref 0–0.2)
URATE SERPL-MCNC: 4.9 MG/DL (ref 2.5–6.2)
URINE AMPHETAMINES SCREEN: NEGATIVE
URINE BENZODIAZEPINES SCREEN: NEGATIVE
URINE COCAINE SCREEN: NEGATIVE
URINE MARIJUANA (THC) SCREEN: NEGATIVE
UROBILINOGEN UR-MCNC: NEGATIVE MG/DL (ref ?–2)
WBC # BLD AUTO: 5.8 10^3/UL (ref 4–10.5)

## 2018-01-22 PROCEDURE — 80053 COMPREHEN METABOLIC PANEL: CPT

## 2018-01-22 PROCEDURE — 81001 URINALYSIS AUTO W/SCOPE: CPT

## 2018-01-22 PROCEDURE — 85025 COMPLETE CBC W/AUTO DIFF WBC: CPT

## 2018-01-22 PROCEDURE — 36415 COLL VENOUS BLD VENIPUNCTURE: CPT

## 2018-01-22 PROCEDURE — 83615 LACTATE (LD) (LDH) ENZYME: CPT

## 2018-01-22 PROCEDURE — 4A1HXCZ MONITORING OF PRODUCTS OF CONCEPTION, CARDIAC RATE, EXTERNAL APPROACH: ICD-10-PCS | Performed by: OBSTETRICS & GYNECOLOGY

## 2018-01-22 PROCEDURE — 84156 ASSAY OF PROTEIN URINE: CPT

## 2018-01-22 PROCEDURE — 82570 ASSAY OF URINE CREATININE: CPT

## 2018-01-22 PROCEDURE — 59025 FETAL NON-STRESS TEST: CPT

## 2018-01-22 PROCEDURE — 80307 DRUG TEST PRSMV CHEM ANLYZR: CPT

## 2018-01-22 PROCEDURE — 84550 ASSAY OF BLOOD/URIC ACID: CPT

## 2018-01-28 ENCOUNTER — HOSPITAL ENCOUNTER (EMERGENCY)
Dept: HOSPITAL 62 - ER | Age: 33
Discharge: HOME | End: 2018-01-28
Payer: MEDICAID

## 2018-01-28 VITALS — SYSTOLIC BLOOD PRESSURE: 131 MMHG | DIASTOLIC BLOOD PRESSURE: 86 MMHG

## 2018-01-28 DIAGNOSIS — Z87.891: ICD-10-CM

## 2018-01-28 DIAGNOSIS — O16.3: Primary | ICD-10-CM

## 2018-01-28 DIAGNOSIS — R51: ICD-10-CM

## 2018-01-28 DIAGNOSIS — Z3A.36: ICD-10-CM

## 2018-01-28 LAB
ADD MANUAL DIFF: NO
ALBUMIN SERPL-MCNC: 4 G/DL (ref 3.5–5)
ALP SERPL-CCNC: 77 U/L (ref 38–126)
ALT SERPL-CCNC: 12 U/L (ref 9–52)
ANION GAP SERPL CALC-SCNC: 11 MMOL/L (ref 5–19)
APPEARANCE UR: (no result)
APTT PPP: YELLOW S
AST SERPL-CCNC: 16 U/L (ref 14–36)
BASOPHILS # BLD AUTO: 0 10^3/UL (ref 0–0.2)
BASOPHILS NFR BLD AUTO: 0.5 % (ref 0–2)
BILIRUB DIRECT SERPL-MCNC: 0.2 MG/DL (ref 0–0.4)
BILIRUB SERPL-MCNC: 0.5 MG/DL (ref 0.2–1.3)
BILIRUB UR QL STRIP: NEGATIVE
BUN SERPL-MCNC: 4 MG/DL (ref 7–20)
CALCIUM: 9.7 MG/DL (ref 8.4–10.2)
CHLORIDE SERPL-SCNC: 104 MMOL/L (ref 98–107)
CO2 SERPL-SCNC: 23 MMOL/L (ref 22–30)
EOSINOPHIL # BLD AUTO: 0 10^3/UL (ref 0–0.6)
EOSINOPHIL NFR BLD AUTO: 0.4 % (ref 0–6)
ERYTHROCYTE [DISTWIDTH] IN BLOOD BY AUTOMATED COUNT: 13.2 % (ref 11.5–14)
GLUCOSE SERPL-MCNC: 121 MG/DL (ref 75–110)
GLUCOSE UR STRIP-MCNC: NEGATIVE MG/DL
HCT VFR BLD CALC: 37.1 % (ref 36–47)
HGB BLD-MCNC: 12.6 G/DL (ref 12–15.5)
KETONES UR STRIP-MCNC: NEGATIVE MG/DL
LYMPHOCYTES # BLD AUTO: 1.4 10^3/UL (ref 0.5–4.7)
LYMPHOCYTES NFR BLD AUTO: 19.2 % (ref 13–45)
MCH RBC QN AUTO: 31.1 PG (ref 27–33.4)
MCHC RBC AUTO-ENTMCNC: 34.1 G/DL (ref 32–36)
MCV RBC AUTO: 91 FL (ref 80–97)
MONOCYTES # BLD AUTO: 0.6 10^3/UL (ref 0.1–1.4)
MONOCYTES NFR BLD AUTO: 8.2 % (ref 3–13)
NEUTROPHILS # BLD AUTO: 5.2 10^3/UL (ref 1.7–8.2)
NEUTS SEG NFR BLD AUTO: 71.7 % (ref 42–78)
NITRITE UR QL STRIP: NEGATIVE
PH UR STRIP: 7 [PH] (ref 5–9)
PLATELET # BLD: 209 10^3/UL (ref 150–450)
POTASSIUM SERPL-SCNC: 3.9 MMOL/L (ref 3.6–5)
PROT SERPL-MCNC: 7.3 G/DL (ref 6.3–8.2)
PROT UR STRIP-MCNC: NEGATIVE MG/DL
RBC # BLD AUTO: 4.07 10^6/UL (ref 3.72–5.28)
SODIUM SERPL-SCNC: 137.7 MMOL/L (ref 137–145)
SP GR UR STRIP: 1.01
TOTAL CELLS COUNTED % (AUTO): 100 %
UROBILINOGEN UR-MCNC: NEGATIVE MG/DL (ref ?–2)
WBC # BLD AUTO: 7.2 10^3/UL (ref 4–10.5)

## 2018-01-28 PROCEDURE — 85025 COMPLETE CBC W/AUTO DIFF WBC: CPT

## 2018-01-28 PROCEDURE — 36415 COLL VENOUS BLD VENIPUNCTURE: CPT

## 2018-01-28 PROCEDURE — 80053 COMPREHEN METABOLIC PANEL: CPT

## 2018-01-28 PROCEDURE — 99284 EMERGENCY DEPT VISIT MOD MDM: CPT

## 2018-01-28 PROCEDURE — 81001 URINALYSIS AUTO W/SCOPE: CPT

## 2018-01-28 NOTE — ER DOCUMENT REPORT
ED General





- General


Chief Complaint: High Blood Pressure


Stated Complaint: BLOOD PRESSURE ISSUES


Time Seen by Provider: 01/28/18 15:21


Mode of Arrival: Ambulatory


Information source: Patient


Notes: 





32-year-old female who is 36 weeks pregnant presents with complaints of high 

blood pressure.  Patient is already on blood pressure medication notes she has 

been seen multiple times for preeclampsia and has never had it.  She denies any 

swelling in her extremities admits to a headache without any visual disturbances





pt denies any pregnancy related pain bleeding or ocncerns 


TRAVEL OUTSIDE OF THE [place] IN LAST 30 DAYS: No





- HPI


Onset: Other


Onset/Duration: Intermittent


Quality of pain: Achy


Severity: Mild


Pain Level: 1


Associated symptoms: Headache


Exacerbated by: Denies


Relieved by: Denies


Similar symptoms previously: No


Recently seen / treated by doctor: No





- Related Data


Allergies/Adverse Reactions: 


 





Penicillins Allergy (Severe, Verified 01/28/18 15:04)


 Anaphylaxis


latex Allergy (Intermediate, Verified 01/28/18 15:04)


 Hives











Past Medical History





- Social History


Smoking Status: Former Smoker


Cigarette use (# per day): No


Chew tobacco use (# tins/day): No


Smoking Education Provided: No


Frequency of alcohol use: None


Drug Abuse: None


Family History: Arthritis, CAD, CVA, DM, Hyperlipidemia, Hypertension, 

Malignancy.  denies: COPD, Thyroid Disfunction


Patient has suicidal ideation: No


Patient has homicidal ideation: No





- Past Medical History


Cardiac Medical History: Reports: Hx Hypertension - Procardia (lisinopril failed

)


Neurological Medical History: Reports: Hx Seizures - no meds, last 2017


Renal/ Medical History: Denies: Hx Peritoneal Dialysis





- Immunizations


Immunizations up to date: Yes


Hx Diphtheria, Pertussis, Tetanus Vaccination: Yes





Review of Systems





- Review of Systems


Notes: 





REVIEW OF SYSTEMS:


CONSTITUTIONAL :  Denies fever,  chills, or sweats.  Denies recent illness.


EENT:   Denies eye, ear, throat, or mouth pain or symptoms.  Denies nasal or 

sinus congestion or discharge.  Denies throat, tongue, or mouth swelling or 

difficulty swallowing.


CARDIOVASCULAR:  Denies chest pain.  Denies palpitations or racing or irregular 

heart beat.  Denies ankle edema.


RESPIRATORY:  Denies cough, cold, or chest congestion.  Denies shortness of 

breath, difficulty breathing, or wheezing.


GASTROINTESTINAL:  Denies abdominal pain or distention.  Denies nausea, vomiting

, or diarrhea.  Denies blood in vomitus, stools, or per rectum.  Denies black, 

tarry stools.  Denies constipation. 


GENITOURINARY:  Denies difficulty urinating, painful urination, burning, 

frequency, blood in urine, or discharge.


FEMALE  GENITOURINARY:  Denies vaginal bleeding, heavy or abnormal periods, 

irregular periods.  Denies vaginal discharge or odor. 


MUSCULOSKELETAL:  Denies back or neck pain or stiffness.  Denies joint pain or 

swelling.


SKIN:   Denies rash, lesions or sores.


HEMATOLOGIC :   Denies easy bruising or bleeding.


LYMPHATIC:  Denies swollen, enlarged glands.


NEUROLOGICAL:  mild headache 


PSYCHIATRIC:  Denies anxiety or stress.  Denies depression, suicidal ideation, 

or homicidal ideation.





ALL OTHER SYSTEMS REVIEWED AND NEGATIVE.











PHYSICAL EXAMINATION:





GENERAL: Well-appearing, well-nourished and in no acute distress.





HEAD: Atraumatic, normocephalic.





EYES: Pupils equal round and reactive to light, extraocular movements intact, 

conjunctiva are normal.





ENT: Nares patent, oropharynx clear without exudates.  Moist mucous membranes.





NECK: Normal range of motion, supple without lymphadenopathy





LUNGS: Breath sounds clear to auscultation bilaterally and equal.  No wheezes 

rales or rhonchi.





HEART: Regular rate and rhythm without murmurs





ABDOMEN: gravid abd non tender





Female : deferred





Musculoskeletal: Normal range of motion, no pitting or edema.  No cyanosis.





NEUROLOGICAL: Cranial nerves grossly intact.  Normal speech, normal gait.  

Normal sensory, motor exams





PSYCH: Normal mood, normal affect.





SKIN: Warm, Dry, normal turgor, no rashes or lesions noted.

















Dictation was performed using Dragon voice recognition software





Physical Exam





- Vital signs


Vitals: 


 











Temp Pulse Resp BP Pulse Ox


 


 98.3 F   106 H  20   137/90 H  96 


 


 01/28/18 15:08  01/28/18 15:08  01/28/18 15:08  01/28/18 15:08  01/28/18 15:08














Course





- Re-evaluation


Re-evalutation: 





01/28/18 16:59


Patient's presenting blood pressure is not worrisome for preeclampsia, she is 

on medications, patient otherwise looks well in no distress, I will discharge 

home as lab work noted no acute abnormality.  Patient is very happy with this 

plan.





After performing a Medical Screening Examination, I estimate there is LOW risk 

for ACUTE GLAUCOMA, TEMPORAL ARTERITIS, MENINGITIS, INCRANIAL HEMORRHAGE, or 

ISCHEMIC STROKE thus I consider the discharge disposition reasonable.  I have 

reevaluated this patient multiple times and no significant life threatening 

changes are noted. The patient and I have discussed the diagnosis and risks, 

and we agree with discharging home with close follow-up with the understanding 

that symptoms and presentations can change. We also discussed returning to the 

Emergency Department immediately if new or worsening symptoms occur. We have 

discussed the symptoms which are most concerning (e.g., changing or worsening 

symptoms, new numbness or weakness, vomiting, fever) that necessitate immediate 

return.





- Vital Signs


Vital signs: 


 











Temp Pulse Resp BP Pulse Ox


 


 98.3 F   106 H  20   137/90 H  96 


 


 01/28/18 15:08  01/28/18 15:08  01/28/18 15:08  01/28/18 15:08  01/28/18 15:08














- Laboratory


Result Diagrams: 


 01/28/18 15:42





 01/28/18 15:42


Laboratory results interpreted by me: 


 











  01/28/18





  15:42


 


BUN  4 L


 


Glucose  121 H














Discharge





- Discharge


Clinical Impression: 


 htn in pregnancy





Headache


Qualifiers:


 Headache type: unspecified Headache chronicity pattern: acute headache 

Intractability: not intractable Qualified Code(s): R51 - Headache





Condition: Stable


Disposition: HOME, SELF-CARE


Instructions:  High Blood Pressure ([facility])


Additional Instructions: 


Follow up with your physician tomorrow for further care or return to the ED 

IMMEDIATELY if symptoms worsen or new concerns occur. If you cannot afford to 

follow up with your primary care physician a list of low cost clinics have been 

provided at the end of your discharge papers as well.

## 2018-02-02 ENCOUNTER — HOSPITAL ENCOUNTER (OUTPATIENT)
Dept: HOSPITAL 62 - LC | Age: 33
Discharge: HOME | End: 2018-02-02
Attending: OBSTETRICS & GYNECOLOGY
Payer: MEDICAID

## 2018-02-02 DIAGNOSIS — Z3A.35: ICD-10-CM

## 2018-02-02 DIAGNOSIS — O10.913: Primary | ICD-10-CM

## 2018-02-02 LAB
ADD MANUAL DIFF: NO
ALBUMIN SERPL-MCNC: 3.3 G/DL (ref 3.5–5)
ALP SERPL-CCNC: 69 U/L (ref 38–126)
ALT SERPL-CCNC: 16 U/L (ref 9–52)
ANION GAP SERPL CALC-SCNC: 11 MMOL/L (ref 5–19)
APPEARANCE UR: (no result)
APTT PPP: YELLOW S
AST SERPL-CCNC: 17 U/L (ref 14–36)
BARBITURATES UR QL SCN: NEGATIVE
BASOPHILS # BLD AUTO: 0 10^3/UL (ref 0–0.2)
BASOPHILS NFR BLD AUTO: 0.6 % (ref 0–2)
BILIRUB DIRECT SERPL-MCNC: 0.1 MG/DL (ref 0–0.4)
BILIRUB SERPL-MCNC: 0.3 MG/DL (ref 0.2–1.3)
BILIRUB UR QL STRIP: NEGATIVE
BUN SERPL-MCNC: 6 MG/DL (ref 7–20)
CALCIUM: 8.7 MG/DL (ref 8.4–10.2)
CHLORIDE SERPL-SCNC: 105 MMOL/L (ref 98–107)
CO2 SERPL-SCNC: 20 MMOL/L (ref 22–30)
CREAT UR-MCNC: 187 MG/DL (ref 16–327)
EOSINOPHIL # BLD AUTO: 0 10^3/UL (ref 0–0.6)
EOSINOPHIL NFR BLD AUTO: 0.6 % (ref 0–6)
ERYTHROCYTE [DISTWIDTH] IN BLOOD BY AUTOMATED COUNT: 13.6 % (ref 11.5–14)
GLUCOSE SERPL-MCNC: 123 MG/DL (ref 75–110)
GLUCOSE UR STRIP-MCNC: NEGATIVE MG/DL
HCT VFR BLD CALC: 32.4 % (ref 36–47)
HGB BLD-MCNC: 11.3 G/DL (ref 12–15.5)
KETONES UR STRIP-MCNC: NEGATIVE MG/DL
LDH1 SERPL-CCNC: 404 U/L (ref 313–618)
LYMPHOCYTES # BLD AUTO: 1.4 10^3/UL (ref 0.5–4.7)
LYMPHOCYTES NFR BLD AUTO: 22.4 % (ref 13–45)
MCH RBC QN AUTO: 31.8 PG (ref 27–33.4)
MCHC RBC AUTO-ENTMCNC: 35 G/DL (ref 32–36)
MCV RBC AUTO: 91 FL (ref 80–97)
METHADONE UR QL SCN: NEGATIVE
MONOCYTES # BLD AUTO: 0.6 10^3/UL (ref 0.1–1.4)
MONOCYTES NFR BLD AUTO: 10.1 % (ref 3–13)
NEUTROPHILS # BLD AUTO: 4 10^3/UL (ref 1.7–8.2)
NEUTS SEG NFR BLD AUTO: 66.3 % (ref 42–78)
NITRITE UR QL STRIP: NEGATIVE
PCP UR QL SCN: NEGATIVE
PH UR STRIP: 6 [PH] (ref 5–9)
PLATELET # BLD: 177 10^3/UL (ref 150–450)
POTASSIUM SERPL-SCNC: 3.8 MMOL/L (ref 3.6–5)
PROT SERPL-MCNC: 6.4 G/DL (ref 6.3–8.2)
PROT UR STRIP-MCNC: 12.6 MG/DL (ref ?–12)
PROT UR STRIP-MCNC: NEGATIVE MG/DL
RBC # BLD AUTO: 3.56 10^6/UL (ref 3.72–5.28)
SODIUM SERPL-SCNC: 135.6 MMOL/L (ref 137–145)
SP GR UR STRIP: 1.01
TOTAL CELLS COUNTED % (AUTO): 100 %
UR PRO/CREAT RATIO RESULT: 0.1 MG/MG (ref 0–0.2)
URATE SERPL-MCNC: 5.1 MG/DL (ref 2.5–6.2)
URINE AMPHETAMINES SCREEN: NEGATIVE
URINE BENZODIAZEPINES SCREEN: NEGATIVE
URINE COCAINE SCREEN: NEGATIVE
URINE MARIJUANA (THC) SCREEN: NEGATIVE
UROBILINOGEN UR-MCNC: NEGATIVE MG/DL (ref ?–2)
WBC # BLD AUTO: 6.1 10^3/UL (ref 4–10.5)

## 2018-02-02 PROCEDURE — 4A1HXCZ MONITORING OF PRODUCTS OF CONCEPTION, CARDIAC RATE, EXTERNAL APPROACH: ICD-10-PCS | Performed by: OBSTETRICS & GYNECOLOGY

## 2018-02-02 PROCEDURE — 84550 ASSAY OF BLOOD/URIC ACID: CPT

## 2018-02-02 PROCEDURE — 59025 FETAL NON-STRESS TEST: CPT

## 2018-02-02 PROCEDURE — 82570 ASSAY OF URINE CREATININE: CPT

## 2018-02-02 PROCEDURE — 80053 COMPREHEN METABOLIC PANEL: CPT

## 2018-02-02 PROCEDURE — 36415 COLL VENOUS BLD VENIPUNCTURE: CPT

## 2018-02-02 PROCEDURE — 83615 LACTATE (LD) (LDH) ENZYME: CPT

## 2018-02-02 PROCEDURE — 81001 URINALYSIS AUTO W/SCOPE: CPT

## 2018-02-02 PROCEDURE — 80307 DRUG TEST PRSMV CHEM ANLYZR: CPT

## 2018-02-02 PROCEDURE — 84156 ASSAY OF PROTEIN URINE: CPT

## 2018-02-02 PROCEDURE — 85025 COMPLETE CBC W/AUTO DIFF WBC: CPT

## 2018-02-02 NOTE — NON STRESS TEST REPORT
=================================================================

Non Stress Test

=================================================================

Datetime Report Generated by CPN: 02/02/2018 12:40

   

   

=================================================================

DEMOGRAPHIC

=================================================================

   

EGA NST:  35.1

EGA NST:  33.4

   

=================================================================

INDICATION

=================================================================

   

Indication for Study:  Chronic Hypertension

Indication for Study:  Chronic Hypertension; Ordered by Provider

   

=================================================================

MONITORING

=================================================================

   

Monitor Explained:  Monitor Explained; Test Explained; Patient

   Verbalized Understanding

Monitor Explained:  Monitor Explained; Test Explained; Patient

   Verbalized Understanding

Time on Monitor:  02/02/2018 10:35

Time on Monitor:  01/22/2018 13:40

Time off Monitor:  02/02/2018 11:03

Time off Monitor:  01/22/2018 14:20

NST Duration:  28

NST Duration:  40

   

=================================================================

NST INTERVENTIONS

=================================================================

   

NST Interventions:  PO Hydration; Reposition Patient

NST Interventions:  None

Physician Notified NST:  Dr Sherman

Physician Notified NST:  J Carter CNM

BABY A:  [id number]

   

=================================================================

BABY A

=================================================================

   

Fetal Movement :  Present

Fetal Movement :  Present

Contraction Frequency :  none

Contraction Frequency :  Irr

FHR Baseline :  130

FHR Baseline :  125

Accelerations :  15X15

Accelerations :  15X15

Decelerations :  None

Decelerations :  None

Variability :  Moderate 6-25bpm

Variability :  Moderate 6-25bpm

NST Review:  Meets Criteria for Reactive NST

NST Review:  Meets Criteria for Reactive NST

NST Review and Verified By :  ERNESTO HARLEY

NST Results:  Reactive

NST Results:  Reactive

   

=================================================================

NST REPORT

=================================================================

   

Report Trigger:  Send Report

## 2018-02-05 ENCOUNTER — HOSPITAL ENCOUNTER (OUTPATIENT)
Dept: HOSPITAL 62 - LC | Age: 33
Discharge: HOME | End: 2018-02-05
Attending: OBSTETRICS & GYNECOLOGY
Payer: MEDICAID

## 2018-02-05 DIAGNOSIS — O47.03: Primary | ICD-10-CM

## 2018-02-05 DIAGNOSIS — Z3A.35: ICD-10-CM

## 2018-02-05 LAB
APPEARANCE UR: CLEAR
APTT PPP: (no result) S
BARBITURATES UR QL SCN: NEGATIVE
BILIRUB UR QL STRIP: NEGATIVE
GLUCOSE UR STRIP-MCNC: NEGATIVE MG/DL
KETONES UR STRIP-MCNC: NEGATIVE MG/DL
METHADONE UR QL SCN: NEGATIVE
NITRITE UR QL STRIP: NEGATIVE
PCP UR QL SCN: NEGATIVE
PH UR STRIP: 8 [PH] (ref 5–9)
PROT UR STRIP-MCNC: NEGATIVE MG/DL
SP GR UR STRIP: 1.01
URINE AMPHETAMINES SCREEN: NEGATIVE
URINE BENZODIAZEPINES SCREEN: NEGATIVE
URINE COCAINE SCREEN: NEGATIVE
URINE MARIJUANA (THC) SCREEN: NEGATIVE
UROBILINOGEN UR-MCNC: NEGATIVE MG/DL (ref ?–2)

## 2018-02-05 PROCEDURE — 80307 DRUG TEST PRSMV CHEM ANLYZR: CPT

## 2018-02-05 PROCEDURE — 59025 FETAL NON-STRESS TEST: CPT

## 2018-02-05 PROCEDURE — 4A1HXCZ MONITORING OF PRODUCTS OF CONCEPTION, CARDIAC RATE, EXTERNAL APPROACH: ICD-10-PCS | Performed by: OBSTETRICS & GYNECOLOGY

## 2018-02-05 PROCEDURE — 81005 URINALYSIS: CPT

## 2018-02-09 ENCOUNTER — HOSPITAL ENCOUNTER (OUTPATIENT)
Dept: HOSPITAL 62 - LC | Age: 33
Discharge: HOME | End: 2018-02-09
Attending: OBSTETRICS & GYNECOLOGY
Payer: MEDICAID

## 2018-02-09 DIAGNOSIS — Z3A.36: ICD-10-CM

## 2018-02-09 DIAGNOSIS — Z34.93: Primary | ICD-10-CM

## 2018-02-09 LAB
ADD MANUAL DIFF: NO
ALBUMIN SERPL-MCNC: 3.2 G/DL (ref 3.5–5)
ALP SERPL-CCNC: 73 U/L (ref 38–126)
ALT SERPL-CCNC: 13 U/L (ref 9–52)
ANION GAP SERPL CALC-SCNC: 7 MMOL/L (ref 5–19)
APPEARANCE UR: CLEAR
APTT PPP: YELLOW S
AST SERPL-CCNC: 15 U/L (ref 14–36)
BARBITURATES UR QL SCN: NEGATIVE
BASOPHILS # BLD AUTO: 0.1 10^3/UL (ref 0–0.2)
BASOPHILS NFR BLD AUTO: 1.7 % (ref 0–2)
BILIRUB DIRECT SERPL-MCNC: 0 MG/DL (ref 0–0.4)
BILIRUB SERPL-MCNC: 0.3 MG/DL (ref 0.2–1.3)
BILIRUB UR QL STRIP: NEGATIVE
BUN SERPL-MCNC: 7 MG/DL (ref 7–20)
CALCIUM: 8.9 MG/DL (ref 8.4–10.2)
CHLORIDE SERPL-SCNC: 106 MMOL/L (ref 98–107)
CO2 SERPL-SCNC: 22 MMOL/L (ref 22–30)
CREAT UR-MCNC: 81.7 MG/DL (ref 16–327)
EOSINOPHIL # BLD AUTO: 0 10^3/UL (ref 0–0.6)
EOSINOPHIL NFR BLD AUTO: 0.3 % (ref 0–6)
ERYTHROCYTE [DISTWIDTH] IN BLOOD BY AUTOMATED COUNT: 13.3 % (ref 11.5–14)
GLUCOSE SERPL-MCNC: 166 MG/DL (ref 75–110)
GLUCOSE UR STRIP-MCNC: NEGATIVE MG/DL
HCT VFR BLD CALC: 31.2 % (ref 36–47)
HGB BLD-MCNC: 10.8 G/DL (ref 12–15.5)
KETONES UR STRIP-MCNC: NEGATIVE MG/DL
LDH1 SERPL-CCNC: 327 U/L (ref 313–618)
LYMPHOCYTES # BLD AUTO: 1 10^3/UL (ref 0.5–4.7)
LYMPHOCYTES NFR BLD AUTO: 14.5 % (ref 13–45)
MCH RBC QN AUTO: 31.3 PG (ref 27–33.4)
MCHC RBC AUTO-ENTMCNC: 34.6 G/DL (ref 32–36)
MCV RBC AUTO: 91 FL (ref 80–97)
METHADONE UR QL SCN: NEGATIVE
MONOCYTES # BLD AUTO: 0.4 10^3/UL (ref 0.1–1.4)
MONOCYTES NFR BLD AUTO: 6.1 % (ref 3–13)
NEUTROPHILS # BLD AUTO: 5.1 10^3/UL (ref 1.7–8.2)
NEUTS SEG NFR BLD AUTO: 77.4 % (ref 42–78)
NITRITE UR QL STRIP: NEGATIVE
PCP UR QL SCN: NEGATIVE
PH UR STRIP: 7 [PH] (ref 5–9)
PLATELET # BLD: 179 10^3/UL (ref 150–450)
POTASSIUM SERPL-SCNC: 3.8 MMOL/L (ref 3.6–5)
PROT SERPL-MCNC: 5.7 G/DL (ref 6.3–8.2)
PROT UR STRIP-MCNC: 18.4 MG/DL (ref ?–12)
PROT UR STRIP-MCNC: NEGATIVE MG/DL
RBC # BLD AUTO: 3.44 10^6/UL (ref 3.72–5.28)
SODIUM SERPL-SCNC: 135 MMOL/L (ref 137–145)
SP GR UR STRIP: 1.01
TOTAL CELLS COUNTED % (AUTO): 100 %
UR PRO/CREAT RATIO RESULT: 0.2 MG/MG (ref 0–0.2)
URATE SERPL-MCNC: 5.5 MG/DL (ref 2.5–6.2)
URINE AMPHETAMINES SCREEN: NEGATIVE
URINE BENZODIAZEPINES SCREEN: NEGATIVE
URINE COCAINE SCREEN: NEGATIVE
URINE MARIJUANA (THC) SCREEN: NEGATIVE
UROBILINOGEN UR-MCNC: NEGATIVE MG/DL (ref ?–2)
WBC # BLD AUTO: 6.6 10^3/UL (ref 4–10.5)

## 2018-02-09 PROCEDURE — 82570 ASSAY OF URINE CREATININE: CPT

## 2018-02-09 PROCEDURE — 80053 COMPREHEN METABOLIC PANEL: CPT

## 2018-02-09 PROCEDURE — 4A1HXCZ MONITORING OF PRODUCTS OF CONCEPTION, CARDIAC RATE, EXTERNAL APPROACH: ICD-10-PCS | Performed by: OBSTETRICS & GYNECOLOGY

## 2018-02-09 PROCEDURE — 84550 ASSAY OF BLOOD/URIC ACID: CPT

## 2018-02-09 PROCEDURE — 83615 LACTATE (LD) (LDH) ENZYME: CPT

## 2018-02-09 PROCEDURE — 59025 FETAL NON-STRESS TEST: CPT

## 2018-02-09 PROCEDURE — 84156 ASSAY OF PROTEIN URINE: CPT

## 2018-02-09 PROCEDURE — 80307 DRUG TEST PRSMV CHEM ANLYZR: CPT

## 2018-02-09 PROCEDURE — 36415 COLL VENOUS BLD VENIPUNCTURE: CPT

## 2018-02-09 PROCEDURE — 81001 URINALYSIS AUTO W/SCOPE: CPT

## 2018-02-09 PROCEDURE — 85025 COMPLETE CBC W/AUTO DIFF WBC: CPT

## 2018-02-09 NOTE — NON STRESS TEST REPORT
=================================================================

Non Stress Test

=================================================================

Datetime Report Generated by CPN: 02/09/2018 11:01

   

   

=================================================================

DEMOGRAPHIC

=================================================================

   

EGA NST:  35.4

   

=================================================================

INDICATION

=================================================================

   

Indication for Study:  Other

Indication for Study (NST) Other:  LC

   

=================================================================

MONITORING

=================================================================

   

Monitor Explained:  Monitor Explained; Test Explained; Patient

   Verbalized Understanding

Time on Monitor:  02/05/2018 22:06

Time off Monitor:  02/05/2018 22:55

NST Duration:  49

   

=================================================================

NST INTERVENTIONS

=================================================================

   

NST Interventions:  PO Hydration

Physician Notified NST:  Marks

BABY A:  [id number]

   

=================================================================

BABY A

=================================================================

   

Fetal Movement :  Present

Contraction Frequency :  x1 +irritablity

FHR Baseline :  130

Accelerations :  15X15

Decelerations :  None

Variability :  Moderate 6-25bpm

NST Review:  Meets Criteria for Reactive NST

NST Review and Verified By :  B Trejo, RN

NST Results:  Reactive

   

=================================================================

NST REPORT

=================================================================

   

Report Trigger:  Send Report

## 2018-02-09 NOTE — NON STRESS TEST REPORT
=================================================================

Non Stress Test

=================================================================

Datetime Report Generated by CPN: 02/09/2018 12:58

   

   

=================================================================

DEMOGRAPHIC

=================================================================

   

EGA NST:  36.1

   

=================================================================

INDICATION

=================================================================

   

Indication for Study:  Ordered by Provider

   

=================================================================

VITAL SIGNS

=================================================================

   

Temperature - NST:  97.5

Pulse - NST:  96

RESP - NST:  14

NBPSYS NST:  126

NBPDIA NST:  78

   

=================================================================

MONITORING

=================================================================

   

Monitor Explained:  Monitor Explained; Test Explained; Patient

   Verbalized Understanding

Time on Monitor:  02/09/2018 11:05

Time off Monitor:  02/09/2018 11:25

NST Duration:  20

   

=================================================================

NST INTERVENTIONS

=================================================================

   

NST Interventions:  PO Hydration

Physician Notified NST:  H Serge CNM

   

=================================================================

BABY A

=================================================================

   

Fetal Movement :  Present

Contraction Frequency :  none

FHR Baseline :  125

Accelerations :  15X15

Decelerations :  None

Variability :  Moderate 6-25bpm

NST Review:  Meets Criteria for Reactive NST

NST Review and Verified By :  MARISOL Pickett RNC

NST Results:  Reactive

   

=================================================================

NST REPORT

=================================================================

   

Report Trigger:  Send Report

## 2018-02-12 ENCOUNTER — HOSPITAL ENCOUNTER (INPATIENT)
Dept: HOSPITAL 62 - LR | Age: 33
LOS: 4 days | Discharge: HOME | End: 2018-02-16
Attending: OBSTETRICS & GYNECOLOGY | Admitting: OBSTETRICS & GYNECOLOGY
Payer: MEDICAID

## 2018-02-12 DIAGNOSIS — O32.4XX0: ICD-10-CM

## 2018-02-12 DIAGNOSIS — D62: ICD-10-CM

## 2018-02-12 DIAGNOSIS — Z3A.36: ICD-10-CM

## 2018-02-12 DIAGNOSIS — Z30.2: ICD-10-CM

## 2018-02-12 DIAGNOSIS — G40.909: ICD-10-CM

## 2018-02-12 LAB
ADD MANUAL DIFF: NO
ALBUMIN SERPL-MCNC: 3.1 G/DL (ref 3.5–5)
ALP SERPL-CCNC: 75 U/L (ref 38–126)
ALT SERPL-CCNC: 21 U/L (ref 9–52)
ANION GAP SERPL CALC-SCNC: 9 MMOL/L (ref 5–19)
APPEARANCE UR: (no result)
APTT PPP: YELLOW S
AST SERPL-CCNC: 14 U/L (ref 14–36)
BARBITURATES UR QL SCN: NEGATIVE
BASOPHILS # BLD AUTO: 0 10^3/UL (ref 0–0.2)
BASOPHILS NFR BLD AUTO: 0.4 % (ref 0–2)
BILIRUB DIRECT SERPL-MCNC: 0.4 MG/DL (ref 0–0.4)
BILIRUB SERPL-MCNC: 0.4 MG/DL (ref 0.2–1.3)
BILIRUB UR QL STRIP: NEGATIVE
BUN SERPL-MCNC: 6 MG/DL (ref 7–20)
CALCIUM: 9 MG/DL (ref 8.4–10.2)
CHLORIDE SERPL-SCNC: 107 MMOL/L (ref 98–107)
CO2 SERPL-SCNC: 21 MMOL/L (ref 22–30)
CREAT UR-MCNC: 125.5 MG/DL (ref 16–327)
EOSINOPHIL # BLD AUTO: 0 10^3/UL (ref 0–0.6)
EOSINOPHIL NFR BLD AUTO: 0.4 % (ref 0–6)
ERYTHROCYTE [DISTWIDTH] IN BLOOD BY AUTOMATED COUNT: 13.6 % (ref 11.5–14)
GLUCOSE SERPL-MCNC: 100 MG/DL (ref 75–110)
GLUCOSE UR STRIP-MCNC: NEGATIVE MG/DL
HCT VFR BLD CALC: 32.5 % (ref 36–47)
HGB BLD-MCNC: 11.1 G/DL (ref 12–15.5)
KETONES UR STRIP-MCNC: NEGATIVE MG/DL
LDH1 SERPL-CCNC: 338 U/L (ref 313–618)
LYMPHOCYTES # BLD AUTO: 1.1 10^3/UL (ref 0.5–4.7)
LYMPHOCYTES NFR BLD AUTO: 18.4 % (ref 13–45)
MCH RBC QN AUTO: 31.2 PG (ref 27–33.4)
MCHC RBC AUTO-ENTMCNC: 34.3 G/DL (ref 32–36)
MCV RBC AUTO: 91 FL (ref 80–97)
METHADONE UR QL SCN: NEGATIVE
MONOCYTES # BLD AUTO: 0.6 10^3/UL (ref 0.1–1.4)
MONOCYTES NFR BLD AUTO: 10.1 % (ref 3–13)
NEUTROPHILS # BLD AUTO: 4.1 10^3/UL (ref 1.7–8.2)
NEUTS SEG NFR BLD AUTO: 70.7 % (ref 42–78)
NITRITE UR QL STRIP: NEGATIVE
PCP UR QL SCN: NEGATIVE
PH UR STRIP: 7 [PH] (ref 5–9)
PLATELET # BLD: 183 10^3/UL (ref 150–450)
POTASSIUM SERPL-SCNC: 4 MMOL/L (ref 3.6–5)
PROT SERPL-MCNC: 6.2 G/DL (ref 6.3–8.2)
PROT UR STRIP-MCNC: 18.8 MG/DL (ref ?–12)
PROT UR STRIP-MCNC: NEGATIVE MG/DL
RBC # BLD AUTO: 3.57 10^6/UL (ref 3.72–5.28)
SODIUM SERPL-SCNC: 137.3 MMOL/L (ref 137–145)
SP GR UR STRIP: 1.01
TOTAL CELLS COUNTED % (AUTO): 100 %
UR PRO/CREAT RATIO RESULT: 0.1 MG/MG (ref 0–0.2)
URATE SERPL-MCNC: 5.4 MG/DL (ref 2.5–6.2)
URINE AMPHETAMINES SCREEN: NEGATIVE
URINE BENZODIAZEPINES SCREEN: NEGATIVE
URINE COCAINE SCREEN: NEGATIVE
URINE MARIJUANA (THC) SCREEN: NEGATIVE
UROBILINOGEN UR-MCNC: NEGATIVE MG/DL (ref ?–2)
WBC # BLD AUTO: 5.7 10^3/UL (ref 4–10.5)

## 2018-02-12 PROCEDURE — 94799 UNLISTED PULMONARY SVC/PX: CPT

## 2018-02-12 PROCEDURE — 86850 RBC ANTIBODY SCREEN: CPT

## 2018-02-12 PROCEDURE — 85027 COMPLETE CBC AUTOMATED: CPT

## 2018-02-12 PROCEDURE — 80053 COMPREHEN METABOLIC PANEL: CPT

## 2018-02-12 PROCEDURE — 86701 HIV-1ANTIBODY: CPT

## 2018-02-12 PROCEDURE — 36415 COLL VENOUS BLD VENIPUNCTURE: CPT

## 2018-02-12 PROCEDURE — 83735 ASSAY OF MAGNESIUM: CPT

## 2018-02-12 PROCEDURE — 86592 SYPHILIS TEST NON-TREP QUAL: CPT

## 2018-02-12 PROCEDURE — 85025 COMPLETE CBC W/AUTO DIFF WBC: CPT

## 2018-02-12 PROCEDURE — 86900 BLOOD TYPING SEROLOGIC ABO: CPT

## 2018-02-12 PROCEDURE — 88302 TISSUE EXAM BY PATHOLOGIST: CPT

## 2018-02-12 PROCEDURE — 80307 DRUG TEST PRSMV CHEM ANLYZR: CPT

## 2018-02-12 PROCEDURE — 83615 LACTATE (LD) (LDH) ENZYME: CPT

## 2018-02-12 PROCEDURE — 84156 ASSAY OF PROTEIN URINE: CPT

## 2018-02-12 PROCEDURE — 81001 URINALYSIS AUTO W/SCOPE: CPT

## 2018-02-12 PROCEDURE — 86901 BLOOD TYPING SEROLOGIC RH(D): CPT

## 2018-02-12 PROCEDURE — 82570 ASSAY OF URINE CREATININE: CPT

## 2018-02-12 PROCEDURE — 84550 ASSAY OF BLOOD/URIC ACID: CPT

## 2018-02-12 RX ADMIN — VANCOMYCIN HYDROCHLORIDE SCH MG: 1 INJECTION, POWDER, LYOPHILIZED, FOR SOLUTION INTRAVENOUS at 16:09

## 2018-02-12 RX ADMIN — MAGNESIUM SULFATE IN WATER PRN ML: 40 INJECTION, SOLUTION INTRAVENOUS at 13:18

## 2018-02-12 NOTE — L&D PROGRESS NOTES
=================================================================

PROGRESS NOTES

=================================================================

Datetime Report Generated by CPN: 2018 21:50

   

   

=================================================================

PROGRESS NOTE

=================================================================

   

Impression:  Normal Progression of Labor

Procedures:  Sterile Vag Exam

Plan:  Continue Present Management; Induction; Cervical Ripening

Informed Consent Obtained:  Vaginal Delivery; Induction of Labor;

   Risks, Benefits and Alternatives Discussed

Vital Signs :  Reviewed; Within Normal Limits

Comment:  31yo  at 36+4ega with CHTN and SuperImposed PreE with

   intermittent HA presented from [facility] appt today due to severe range

   BPs and HA (intermittent).  Currently on Procardia XL 30mg po BID

   for BP control.  History of Seizures - stopped meds 1 yr ago.  Abnl

   AFP with + OSB.  She is on 4mg of Folic Acid due to h/o seizure

   disorder.  Pt has had mild to normal range BPs with intermittent

   severe range BPs.  Pt closed on presentation.  Cervidil started at

   approx noon.  Cvx now 1cm and will plan for pitocin and COoks

   catheter.  magensium sulfate started due to CHTN with severe range

   BPs.  Anticipate .  H/o  x 3.

   

=================================================================

VAGINAL EXAM

=================================================================

   

Dilatation:  1

Dilatation:  closed

Effacement:  60

Station:  -2

Contractions:  q 1-2

Contractions:  irreg

   

=================================================================

MEMBRANES

=================================================================

   

Membranes:  Intact

Membranes:  Intact

   

=================================================================

FETUS A

=================================================================

   

FHR - Baseline:  130

Monitoring:  External US

Variability:  Moderate 6-25bpm

Decelerations:  None

FHR Category:  Category I

:  36+4

Estimated Fetal Weight (gm):  3100

Fetal Presentation:  Vertex

   

=================================================================

SIGNATURE

=================================================================

   

SIGNATURE:  [id number];14,[id number]

SIGNATURE:  [id number]

SIGNATURE:  14,[id number]

SIGNATURE:  14,[id number]

SIGNATURE:  14,[id number]

Signature:  Electronically signed by Erica Jones MD ([facility]) on

   2018 at 21:49  with User ID: [email or web address]

## 2018-02-13 LAB
ABSOLUTE LYMPHOCYTES# (MANUAL): 0.8 10^3/UL (ref 0.5–4.7)
ABSOLUTE MONOCYTES # (MANUAL): 0.5 10^3/UL (ref 0.1–1.4)
ABSOLUTE NEUTROPHILS# (MANUAL): 11.9 10^3/UL (ref 1.7–8.2)
ADD MANUAL DIFF: YES
ALBUMIN SERPL-MCNC: 3.7 G/DL (ref 3.5–5)
ALP SERPL-CCNC: 99 U/L (ref 38–126)
ALT SERPL-CCNC: 19 U/L (ref 9–52)
ANION GAP SERPL CALC-SCNC: 10 MMOL/L (ref 5–19)
AST SERPL-CCNC: 17 U/L (ref 14–36)
BASOPHILS NFR BLD MANUAL: 0 % (ref 0–2)
BILIRUB DIRECT SERPL-MCNC: 0.1 MG/DL (ref 0–0.4)
BILIRUB SERPL-MCNC: 0.4 MG/DL (ref 0.2–1.3)
BUN SERPL-MCNC: 4 MG/DL (ref 7–20)
CALCIUM: 7.8 MG/DL (ref 8.4–10.2)
CHLORIDE SERPL-SCNC: 106 MMOL/L (ref 98–107)
CO2 SERPL-SCNC: 19 MMOL/L (ref 22–30)
EOSINOPHIL NFR BLD MANUAL: 0 % (ref 0–6)
ERYTHROCYTE [DISTWIDTH] IN BLOOD BY AUTOMATED COUNT: 13.5 % (ref 11.5–14)
GLUCOSE SERPL-MCNC: 113 MG/DL (ref 75–110)
HCT VFR BLD CALC: 35.5 % (ref 36–47)
HGB BLD-MCNC: 12.2 G/DL (ref 12–15.5)
LDH1 SERPL-CCNC: 468 U/L (ref 313–618)
MCH RBC QN AUTO: 31 PG (ref 27–33.4)
MCHC RBC AUTO-ENTMCNC: 34.3 G/DL (ref 32–36)
MCV RBC AUTO: 90 FL (ref 80–97)
MONOCYTES % (MANUAL): 4 % (ref 3–13)
PLATELET # BLD: 201 10^3/UL (ref 150–450)
PLATELET COMMENT: ADEQUATE
POTASSIUM SERPL-SCNC: 3.9 MMOL/L (ref 3.6–5)
PROT SERPL-MCNC: 6.6 G/DL (ref 6.3–8.2)
RBC # BLD AUTO: 3.93 10^6/UL (ref 3.72–5.28)
RBC MORPH BLD: (no result)
SEGMENTED NEUTROPHILS % (MAN): 90 % (ref 42–78)
SODIUM SERPL-SCNC: 135.1 MMOL/L (ref 137–145)
TOTAL CELLS COUNTED BLD: 100
TOXIC GRANULES BLD QL SMEAR: (no result)
URATE SERPL-MCNC: 4.8 MG/DL (ref 2.5–6.2)
VARIANT LYMPHS NFR BLD MANUAL: 6 % (ref 13–45)
WBC # BLD AUTO: 13.2 10^3/UL (ref 4–10.5)

## 2018-02-13 RX ADMIN — MAGNESIUM SULFATE IN WATER PRN ML: 40 INJECTION, SOLUTION INTRAVENOUS at 05:08

## 2018-02-13 RX ADMIN — VANCOMYCIN HYDROCHLORIDE SCH MG: 1 INJECTION, POWDER, LYOPHILIZED, FOR SOLUTION INTRAVENOUS at 04:02

## 2018-02-13 RX ADMIN — VANCOMYCIN HYDROCHLORIDE SCH MG: 1 INJECTION, POWDER, LYOPHILIZED, FOR SOLUTION INTRAVENOUS at 16:10

## 2018-02-13 NOTE — L&D PROGRESS NOTES
=================================================================

PROGRESS NOTES

=================================================================

Datetime Report Generated by CPN: 02/13/2018 15:00

   

   

=================================================================

PROGRESS NOTE

=================================================================

   

Comment:  Pit restarted, minimal pressure, VE 8/90/vtx/-2, had pt push,

   head applied to cervix with push, placed back on peanut ball, family

   at BS

   

=================================================================

FETUS C

=================================================================

   

SIGNATURE:  [id number];14,[id number]

Assignment:  Geovanna Sherman MD

Signature:  Electronically signed by Raisa Carter CNM on 2/13/2018 at

   15:00  with User ID: [email or web address]

:  Electronically signed by Raisa Carter CNM on 2/13/2018 at 15:00  with

   User ID: [email or web address]

## 2018-02-13 NOTE — L&D PROGRESS NOTES
=================================================================

PROGRESS NOTES

=================================================================

Datetime Report Generated by CPN: 02/13/2018 11:09

   

   

=================================================================

PROGRESS NOTE

=================================================================

   

Impression:  Normal Progression of Labor

Procedures:  Artificial ROM; Fetal Scalp Electrode; Sterile Vag Exam

Informed Consent Obtained:  Vaginal Delivery

Comment:  VE = 8-9/100/vtx/-1, FSE applied to ROM, small fluid clear, +

   scalp stimulation, pt sleeping, feeling pressure

   

=================================================================

FETUS A

=================================================================

   

Monitoring:  External US

   

=================================================================

FETUS C

=================================================================

   

SIGNATURE:  14,[id number];10,[id number]

Assignment:  Geovanna Sherman MD

Signature:  Electronically signed by Raisa Carter CNM on 2/13/2018 at

   11:08  with User ID: [email or web address]

:  Electronically signed by Raisa Carter CNM on 2/13/2018 at 11:08  with

   User ID: [email or web address]

## 2018-02-13 NOTE — L&D PROGRESS NOTES
=================================================================

PROGRESS NOTES

=================================================================

Datetime Report Generated by CPN: 02/13/2018 04:40

   

   

=================================================================

PROGRESS NOTE

=================================================================

   

Impression:  Normal Progression of Labor

Procedures:  Sterile Vag Exam

Plan:  Continue Present Management; Induction

Informed Consent Obtained:  Vaginal Delivery; Induction of Labor;

   Risks, Benefits and Alternatives Discussed

Vital Signs :  Reviewed; Within Normal Limits

Comment:  Cooks catheter removed as it was in vagina.  cvx 5-6cm/60/-2

   but not in active labor yet.  Pit at 12.  Unable to AROM due to

   fetal station too high.  Will attempt to AROM when fetal descent

   improved.  Reassuring FWB.

      

   

=================================================================

VAGINAL EXAM

=================================================================

   

Dilatation:  5

Effacement:  60

Station:  -2

Contractions:  q 2-3

   

=================================================================

FETUS A

=================================================================

   

FHR - Baseline:  125

Monitoring:  External US

Variability:  Moderate 6-25bpm

Accelerations:  15X15

Decelerations:  None

FHR Category:  Category I

   

=================================================================

FETUS C

=================================================================

   

SIGNATURE:  14,[id number];10,[id number]

Signature:  Electronically signed by MD LUCIANO Hope) on

   2/13/2018 at 04:39  with User ID: [email or web address]

## 2018-02-13 NOTE — L&D PROGRESS NOTES
=================================================================

PROGRESS NOTES

=================================================================

Datetime Report Generated by CPN: 02/13/2018 10:51

   

   

=================================================================

PROGRESS NOTE

=================================================================

   

Impression:  Reassuring Fetal Heart Rate

Plan:  Induction

Comment:  Sleeping, hsb at BS, tachysystole, Pitocin decreased to 8, BP

   stable

   

=================================================================

FETUS A

=================================================================

   

FHR - Baseline:  120

Variability:  Moderate 6-25bpm

Decelerations:  None

   

=================================================================

FETUS C

=================================================================

   

SIGNATURE:  10,[id number];14,[id number]

Assignment:  Goevanna Sherman MD

Signature:  Electronically signed by Raisa Carter CNM on 2/13/2018 at

   10:50  with User ID: [email or web address]

:  Electronically signed by Raisa Carter CNM on 2/13/2018 at 10:50  with

   User ID: [email or web address]

## 2018-02-13 NOTE — L&D PROGRESS NOTES
=================================================================

PROGRESS NOTES

=================================================================

Datetime Report Generated by CPN: 02/13/2018 11:19

   

   

=================================================================

PROGRESS NOTE

=================================================================

   

Comment:  Pitocin off

   

=================================================================

FETUS C

=================================================================

   

SIGNATURE:  10,[id number];14,[id number]

Assignment:  Geovanna Sherman MD

Signature:  Electronically signed by Raisa Carter CNM on 2/13/2018 at

   11:17  with User ID: [email or web address]

:  Electronically signed by Raisa Carter CNM on 2/13/2018 at 11:17  with

   User ID: [email or web address]

## 2018-02-13 NOTE — L&D PROGRESS NOTES
=================================================================

PROGRESS NOTES

=================================================================

Datetime Report Generated by CPN: 02/13/2018 14:05

   

   

=================================================================

PROGRESS NOTE

=================================================================

   

Comment:  Sitting up in bed, uc's q 3, feeling some pressure, Cat 1

   strip, will restart Pitocin @ 2

   

=================================================================

FETUS C

=================================================================

   

SIGNATURE:  14,[id number];10,[id number]

Assignment:  Geovanna Sherman MD

Signature:  Electronically signed by Raisa Carter CNM on 2/13/2018 at

   14:04  with User ID: [email or web address]

:  Electronically signed by Raisa Carter CNM on 2/13/2018 at 14:04  with

   User ID: [email or web address]

## 2018-02-13 NOTE — L&D PROGRESS NOTES
=================================================================

PROGRESS NOTES

=================================================================

Datetime Report Generated by CPN: 02/13/2018 10:04

   

   

=================================================================

PROGRESS NOTE

=================================================================

   

Comment:  in middle of epidural, pt was anxious, could not tolerate Dr.

   touching her back, Epidural procedure stopped, Nubain 10mg IV for

   pain

   

=================================================================

FETUS C

=================================================================

   

SIGNATURE:  [id number];10,[id number]

Assignment:  Geovanna Sherman MD

Signature:  Electronically signed by Raisa Carter CNM on 2/13/2018 at

   10:04  with User ID: [email or web address]

:  Electronically signed by Raisa Carter CNM on 2/13/2018 at 10:04  with

   User ID: [email or web address]

## 2018-02-13 NOTE — L&D PROGRESS NOTES
=================================================================

PROGRESS NOTES

=================================================================

Datetime Report Generated by CPN: 02/13/2018 09:57

   

   

=================================================================

PROGRESS NOTE

=================================================================

   

Impression:  Normal Progression of Labor; Reassuring Fetal Heart Rate

Plan:  Continue Present Management; Induction

Vital Signs :  Reviewed

Comment:  Sitting up for epidural, Cat 1 strip, uc's q 2-3 x 60 sec,

   Pitocin decreased

   

=================================================================

FETUS A

=================================================================

   

Accelerations:  15X15

   

=================================================================

FETUS C

=================================================================

   

SIGNATURE:  10,[id number];14,[id number]

Assignment:  Geovanna Sherman MD

Signature:  Electronically signed by Raisa Carter CNM on 2/13/2018 at

   09:56  with User ID: [email or web address]

:  Electronically signed by Raisa Carter CNM on 2/13/2018 at 09:56  with

   User ID: [email or web address]

## 2018-02-14 LAB
ADD MANUAL DIFF: NO
ALBUMIN SERPL-MCNC: 2.5 G/DL (ref 3.5–5)
ALP SERPL-CCNC: 68 U/L (ref 38–126)
ALT SERPL-CCNC: 16 U/L (ref 9–52)
ANION GAP SERPL CALC-SCNC: 10 MMOL/L (ref 5–19)
AST SERPL-CCNC: 17 U/L (ref 14–36)
BASOPHILS # BLD AUTO: 0 10^3/UL (ref 0–0.2)
BASOPHILS NFR BLD AUTO: 0.2 % (ref 0–2)
BILIRUB DIRECT SERPL-MCNC: 0.3 MG/DL (ref 0–0.4)
BILIRUB SERPL-MCNC: 0.4 MG/DL (ref 0.2–1.3)
BUN SERPL-MCNC: 5 MG/DL (ref 7–20)
CALCIUM: 7.3 MG/DL (ref 8.4–10.2)
CHLORIDE SERPL-SCNC: 107 MMOL/L (ref 98–107)
CO2 SERPL-SCNC: 17 MMOL/L (ref 22–30)
EOSINOPHIL # BLD AUTO: 0 10^3/UL (ref 0–0.6)
EOSINOPHIL NFR BLD AUTO: 0 % (ref 0–6)
ERYTHROCYTE [DISTWIDTH] IN BLOOD BY AUTOMATED COUNT: 13.8 % (ref 11.5–14)
GLUCOSE SERPL-MCNC: 185 MG/DL (ref 75–110)
HCT VFR BLD CALC: 28.3 % (ref 36–47)
HGB BLD-MCNC: 9.6 G/DL (ref 12–15.5)
LDH1 SERPL-CCNC: 557 U/L (ref 313–618)
LYMPHOCYTES # BLD AUTO: 1.2 10^3/UL (ref 0.5–4.7)
LYMPHOCYTES NFR BLD AUTO: 7.4 % (ref 13–45)
MAGNESIUM SERPL-MCNC: 5.3 MG/DL (ref 1.6–2.3)
MCH RBC QN AUTO: 31.3 PG (ref 27–33.4)
MCHC RBC AUTO-ENTMCNC: 33.8 G/DL (ref 32–36)
MCV RBC AUTO: 93 FL (ref 80–97)
MONOCYTES # BLD AUTO: 0.6 10^3/UL (ref 0.1–1.4)
MONOCYTES NFR BLD AUTO: 3.8 % (ref 3–13)
NEUTROPHILS # BLD AUTO: 14.2 10^3/UL (ref 1.7–8.2)
NEUTS SEG NFR BLD AUTO: 88.6 % (ref 42–78)
PLATELET # BLD: 179 10^3/UL (ref 150–450)
POTASSIUM SERPL-SCNC: 3.5 MMOL/L (ref 3.6–5)
PROT SERPL-MCNC: 5 G/DL (ref 6.3–8.2)
RBC # BLD AUTO: 3.05 10^6/UL (ref 3.72–5.28)
SODIUM SERPL-SCNC: 133.5 MMOL/L (ref 137–145)
TOTAL CELLS COUNTED % (AUTO): 100 %
URATE SERPL-MCNC: 5.2 MG/DL (ref 2.5–6.2)
WBC # BLD AUTO: 16 10^3/UL (ref 4–10.5)

## 2018-02-14 PROCEDURE — 0UB70ZZ EXCISION OF BILATERAL FALLOPIAN TUBES, OPEN APPROACH: ICD-10-PCS | Performed by: OBSTETRICS & GYNECOLOGY

## 2018-02-14 RX ADMIN — DOCUSATE SODIUM SCH MG: 100 CAPSULE, LIQUID FILLED ORAL at 17:50

## 2018-02-14 RX ADMIN — KETOROLAC TROMETHAMINE SCH MG: 30 INJECTION, SOLUTION INTRAMUSCULAR at 18:10

## 2018-02-14 RX ADMIN — Medication SCH CAP: at 12:05

## 2018-02-14 RX ADMIN — LABETALOL HYDROCHLORIDE SCH MG: 200 TABLET, FILM COATED ORAL at 06:50

## 2018-02-14 RX ADMIN — OXYCODONE AND ACETAMINOPHEN PRN TAB: 5; 325 TABLET ORAL at 17:46

## 2018-02-14 RX ADMIN — DOCUSATE SODIUM SCH MG: 100 CAPSULE, LIQUID FILLED ORAL at 12:05

## 2018-02-14 RX ADMIN — KETOROLAC TROMETHAMINE SCH MG: 30 INJECTION, SOLUTION INTRAMUSCULAR at 20:08

## 2018-02-14 RX ADMIN — LABETALOL HYDROCHLORIDE SCH MG: 200 TABLET, FILM COATED ORAL at 17:49

## 2018-02-14 RX ADMIN — KETOROLAC TROMETHAMINE SCH MG: 30 INJECTION, SOLUTION INTRAMUSCULAR at 05:24

## 2018-02-14 NOTE — ADMISSION PHYSICAL
=================================================================



=================================================================

Datetime Report Generated by CPN: 2018 21:21

   

   

=================================================================

CURRENT ADMISSION

=================================================================

   

Chief Complaint:  Sent from OB Office for Evaluation and Treatment -

   Please Specify; Other

Chief Complaint Other:  CHTN with superimposed preeclampsia with severe

   features

Indication for Induction:  Chronic Primary/Essential HTN; PreEclampsia

Indication for Induction:  , Intrauterine Pregnancy

Admit Plan:  Admit to Unit; Initiate Labor Induction Protocol

   

=================================================================

ALLERGIES

=================================================================

   

Medication Allergies:  Yes

Medication Allergies:  Penicillins/SV/Anaphylaxis (2018);

   latex/MO/Hives (2018)

Medication Allergies:  Penicillins/SV/Anaphylaxis (2018);

   latex/MO/Hives (2018)

Medication Allergies:  Penicillins/SV/Anaphylaxis (2018);

   latex/MO/Hives (2018)

Medication Allergies:  Penicillins/SV/Anaphylaxis (2018);

   latex/MO/Hives (2018)

Medication Allergies:  Penicillins/SV/Anaphylaxis (2018);

   latex/MO/Hives (2018)

Medication Allergies:  Penicillins/SV/Anaphylaxis (2017);

   latex/MO/Hives (2017)

Medication Allergies:  Penicillins/SV/Anaphylaxis (2017);

   latex/MO/Hives (2017)

Medication Allergies:  Penicillins (2017); latex (2017)

Latex:  Latex Allergies

Food Allergies:  none

Environmental Allergies:  none

   

=================================================================

OBSTETRICAL HISTORY

=================================================================

   

EDC:  2018 00:00

:  4

Para:  3

Term:  3

:  0

SAB:  0

IAB:  0

Ectopic:  0

Living:  3

Gestational Diabetes:  No

Rh Sensitization:  No

Incompetent Cervix:  No

SARWAT:  No

Infertility:  No

ART Treatment:  No

Uterine Anomaly:  No

IUGR:  No

Hx Previous C/S:  No

Macrosomia:  No

Hx Loss/Stillborn:  No

PIH:  No

Hx  Death:  No

Placenta Previa/Abruption:  No

Depression/PP Depression:  No

PTL/PROM:  No

Post Partum Hemorrhage:  No

Current Pregnancy Procedures:  Ultrasound

Obstetrical History Comments:   41 weeks  baby boy

    41 weeks   baby boy

    40 weeks  baby girl

    current pregnancy

      

   

=================================================================

***SEE PRENATAL RECORDS***

=================================================================

   

Alcohol:  No

Marijuana :  No

Cocaine:  No

Other Illicit Drugs:  No

Cigarettes:  Former Smoker. [id number]

   

=================================================================

MEDICAL HISTORY

=================================================================

   

Diabetes:  No

Blood Transfusion:  No

Pulmonary Disease (Asthma, TB):  No

Breast Disease:  No

Hypertension:  Yes

Gyn Surgery:  No

Heart Disease:  No

Hosp/Surgery:  No

Autoimmune Disorder:  No

Anesthetic Complications:  No

Kidney Disease:  No

Abnormal Pap Smear:  No

Neuro/Epilepsy:  Yes

Psychiatric Disorders:  No

Other Medical Diseases:  No

Hepatitis/Liver Disease:  No

Significant Family History:  No

Varicosities/Phlebitis:  No

Trauma/Violence :  No

Thyroid Dysfunction:  No

Medical History Comments:  pt has hx of seizure, last one was in 2017

   

=================================================================

INFECTIOUS HISTORY

=================================================================

   

Gonorrhea:  No

Genital Herpes:  No

Chlamydia:  No

Tuberculosis:  No

Syphilis:  No

Hepatitis:  No

HIV/AIDS Exposure:  No

Rash or Viral Illness:  No

HPV:  No

   

=================================================================

PHYSICAL EXAM

=================================================================

   

General:  Normal

HEENT:  Normal

Neurologic:  Normal

Thyroid:  Deferred

Heart:  Normal

Lungs:  Normal

Breast:  Deferred

Back:  Normal

Abdomen:  Normal

Genitourinary Exam:  Deferred

Extremities:  Normal

DTRs:  Normal

Pelvic Type:  Adequate

Vital Signs:  Reviewed

Details Vital Signs:  mild range elevated BPs

   

=================================================================

VAGINAL EXAM

=================================================================

   

Dilatation:  5

Dilatation:  1

Dilatation:  closed

Effacement:  60

Effacement:  60

Station:  -2

Station:  -2

Contraction Comments:  q 2-3

Contraction Comments:  q 1-2

Contraction Comments:  irreg

   

=================================================================

MEMBRANES

=================================================================

   

Membranes:  Intact

Membranes:  Intact

   

=================================================================

FETUS A

=================================================================

   

EGA:  36.4

Monitoring:  External US

FHR- Baseline:  130

Variability:  Moderate 6-25bpm

Accelerations:  15X15

Decelerations:  None

FHR Category:  Category I

Estimated Fetal Weight (gm):  3100

Fetal Presentation:  Vertex

Admit Comment:   at 36+4. CHTN with superimposed pre-eclampsia

   with severe range BPs in office at [facility]. pelvis proven to 7#6oz.

   denies HA/visual changes/RUQ pain. DTRs 1+/1+. plan is for magnesium

   sulfate, cervidil for cervical ripening. comgt with Dr Jones.

   anticipate . 

   

=================================================================

PLANS FOR LABOR AND DELIVERY

=================================================================

   

Labor and Delivery:  Placenta Request

Pain Management:  Natural

Feeding Preference:  Both

Benefit of Breast Feed Discussed:  Yes

Circumcision:  Yes

   

=================================================================

INFORMED CONSENT

=================================================================

   

Informed Consent Obtained:  Vaginal Delivery

Informed Consent Obtained:  Vaginal Delivery; Induction of Labor;

   Risks, Benefits and Alternatives Discussed

Informed Consent Obtained:  Vaginal Delivery; Induction of Labor;

   Risks, Benefits and Alternatives Discussed

Assignment:  Erica Jones MD

Signature:  Electronically signed by Jane Savage CNM  on 2018 at

   12:56  with User ID: [email or web address]

:  Electronically signed by Jane Savage CNM  on 2018 at 12:56  with

   User ID: [email or web address]

## 2018-02-14 NOTE — OPERATIVE REPORT E
Operative Report



NAME: DARRELL MCCARTHY

MRN:  [id number]          : 1985 AGE:  32Y

DATE OF SURGERY: 2018              ROOM: 



PREOPERATIVE DIAGNOSIS:

1.   Intrauterine pregnancy at 36 weeks and 6 days.

2.   Failure to progress.

3.   Superimposed preeclampsia.

4.   Undesired fertility.



POSTOPERATIVE DIAGNOSIS:

1.   Intrauterine pregnancy at 36 weeks and 6 days.

2.   Failure to progress.

3.   Superimposed preeclampsia.

4.   Undesired fertility.



OPERATION:

Low-transverse hysterotomy with [place] tubal ligation.



SURGEON:

LYNNETTE FONSECA M.D.



ANESTHESIA:

Dr. Krishnamurthy with a spinal.



FINDINGS:

A male infant, cephalic presentation, with Apgars of 8 and 9.



ESTIMATED BLOOD LOSS:

600



SPECIMENS REMOVED:

Bilateral fallopian tubes.



DESCRIPTION OF PROCEDURE:

The patient was taken to the operating room, prepared and draped in the

normal sterile fashion in the supine position with leftward tilt.  A

transverse skin incision was made with a scalpel and carried through to

the underlying layer of fascia with the same scalpel.  The fascia was

incised and extended laterally with Shea.  The fascia was then dissected

from the rectus muscle sharply with Shea.  The rectus muscle was divided.

Peritoneal cavity was entered bluntly.  The bladder blade was inserted. 

The hysterotomy was nicked with a scalpel and extended laterally with

surgeon finger fracture.  The infant was then delivered atraumatically,

and the nose and mouth were suctioned with a suction bulb.  The cord was

clamped and cut, and the infant was handed off to awaiting pediatrician.



The cord blood was collected.  The placenta was removed manually.  The

uterus was exteriorized and cleared of clots and debris.  The hysterotomy

was closed with 0 Vicryl in a running locked fashion.  A second layer of

the same suture was used to ensure hemostasis.



Beginning with the fallopian tube on the right, the fallopian tube was

grasped with a [place], and the mesosalpinx was divided using the Bovie. 

A 3-cm section of the fallopian tube was tied off with 2 pieces of 2-0

chromic.  The intermediate section was removed with Metzenbaums, and the

pedicles were coagulated with Bovie to ensure hemostasis.  This was

repeated on the left without difficulty.  The uterus was returned to the

abdomen, and the peritoneal cavity was cleared of clot and debris.  The

pedicles were reinspected and found to be hemostatic as well as the

hysterotomy.  The rectus muscle and peritoneum were reapproximated with a

mattress stitch of 2-0 chromic.  The fascia was closed with 0 Vicryl.  The

subcutaneous layer was closed with plain catgut, and the skin was closed

with 4-0 Vicryl.



The patient tolerated the procedure well.  Sponge, lap, and needle counts

were correct x2.  The patient was taken to recovery in stable condition.









DICTATING PHYSICIAN:  LYNNETTE FONSECA M.D.





[id number]                  DT: 2018    [id number]

PHY#: [id number]            DD: 2018    [id number]

ID:   [id number]           JOB#: [id number]       ACCT: [id number]



cc:LYNNETTE FONSECA M.D.

>







MTDD

## 2018-02-14 NOTE — WARNING SIGNS IN BABIES
=================================================================

VOD Warning Signs

=================================================================

Datetime Report Generated by [facility]: 02/14/2018 19:29

   

VOD#608 -Warning Signs in Babies:  Viewed with Parent(s)/Family   

   (11/04/2017 21:55:Nicole Weldon RN)

## 2018-02-15 LAB
ERYTHROCYTE [DISTWIDTH] IN BLOOD BY AUTOMATED COUNT: 14 % (ref 11.5–14)
HCT VFR BLD CALC: 26.6 % (ref 36–47)
HGB BLD-MCNC: 9.1 G/DL (ref 12–15.5)
MCH RBC QN AUTO: 31.2 PG (ref 27–33.4)
MCHC RBC AUTO-ENTMCNC: 34.1 G/DL (ref 32–36)
MCV RBC AUTO: 92 FL (ref 80–97)
PLATELET # BLD: 196 10^3/UL (ref 150–450)
RBC # BLD AUTO: 2.9 10^6/UL (ref 3.72–5.28)
WBC # BLD AUTO: 9.8 10^3/UL (ref 4–10.5)

## 2018-02-15 RX ADMIN — OXYCODONE AND ACETAMINOPHEN PRN TAB: 5; 325 TABLET ORAL at 18:35

## 2018-02-15 RX ADMIN — SIMETHICONE PRN MG: 80 TABLET, CHEWABLE ORAL at 23:18

## 2018-02-15 RX ADMIN — SIMETHICONE PRN MG: 80 TABLET, CHEWABLE ORAL at 10:03

## 2018-02-15 RX ADMIN — LABETALOL HYDROCHLORIDE SCH MG: 200 TABLET, FILM COATED ORAL at 18:36

## 2018-02-15 RX ADMIN — DOCUSATE SODIUM SCH MG: 100 CAPSULE, LIQUID FILLED ORAL at 10:03

## 2018-02-15 RX ADMIN — SIMETHICONE PRN MG: 80 TABLET, CHEWABLE ORAL at 18:35

## 2018-02-15 RX ADMIN — Medication SCH CAP: at 10:02

## 2018-02-15 RX ADMIN — OXYCODONE AND ACETAMINOPHEN PRN TAB: 5; 325 TABLET ORAL at 00:40

## 2018-02-15 RX ADMIN — OXYCODONE AND ACETAMINOPHEN PRN TAB: 5; 325 TABLET ORAL at 10:03

## 2018-02-15 RX ADMIN — DOCUSATE SODIUM SCH MG: 100 CAPSULE, LIQUID FILLED ORAL at 18:35

## 2018-02-15 RX ADMIN — KETOROLAC TROMETHAMINE SCH MG: 30 INJECTION, SOLUTION INTRAMUSCULAR at 05:17

## 2018-02-15 RX ADMIN — LABETALOL HYDROCHLORIDE SCH MG: 200 TABLET, FILM COATED ORAL at 05:18

## 2018-02-15 RX ADMIN — SIMETHICONE PRN MG: 80 TABLET, CHEWABLE ORAL at 00:43

## 2018-02-15 RX ADMIN — KETOROLAC TROMETHAMINE SCH MG: 30 INJECTION, SOLUTION INTRAMUSCULAR at 14:46

## 2018-02-15 RX ADMIN — OXYCODONE AND ACETAMINOPHEN PRN TAB: 5; 325 TABLET ORAL at 23:18

## 2018-02-15 NOTE — DELIVERY SUMMARY
=================================================================

Del Sum A-C

=================================================================

Datetime Report Generated by [facility]: 02/15/2018 06:48

   

   

=================================================================

DELIVERY PERSONNEL

=================================================================

   

DELIVERY PERSONNEL:  [id number]

Delivery Doctor::  Geovanna Sherman MD

Anesthesiologist::  Wilton Krishnamurthy MD

CRNA::  Ingrid Díaz CRNA

Circulator::  Nicole Weldon RN

 Nurse Practitioner::  CARROLL Villagomez

Nursery Nurse::  Paola Robledo RN MSN

Scrub Tech/CNA:  ST Maria Isabel

Scrub Tech/CNA:  Bernie Semar, CST

   

=================================================================

MATERNAL INFORMATION

=================================================================

   

Delivery Anesthesia:  Spinal

Medications After Delivery:  Pitocin Drip 20 Units/1000ml NSS

Maternal Complications:  None

   

=================================================================

LABOR SUMMARY

=================================================================

   

EDC:  2018 00:00

No. Babies in Womb:  1

 Attempted:  No

Labor Anesthesia:  None

   

=================================================================

LABOR INFORMATION

=================================================================

   

Reason for Induction:  Chronic Primary/Essential HTN

Onset of Labor:  2018 09:20

Cervical Ripening Agents:  Cervidil

Oxytocin:  Induction

Group B Beta Strep:  positive

Antibiotics # of Doses:  3

Antibiotics Time of Last Dose:  1610

Name of Antibiotic Given:  vancomycin

Steroids Given:  None

Reason Steroids Not Administered:  Not Applicable

   

=================================================================

MEMBRANES

=================================================================

   

Membranes Rupture Method:  Artificial

Membranes Rupture Method:  Artificial

Rupture of Membranes:  2018 17:40

Length of Rupture (hr):  7.22

Amniotic Fluid Color:  Clear

Amniotic Fluid Color:  Clear

Amniotic Fluid Amount:  Moderate

Amniotic Fluid Amount:  Small

Amniotic Fluid Odor:  Normal

   

=================================================================

STAGES OF LABOR

=================================================================

   

Stage 3 hr:  0

Stage 3 min:  2

Total Time in Labor hr:  15

Total Time in Labor min:  35

   

=================================================================

VAGINAL DELIVERY

=================================================================

   

Episiotomy:  None

Laceration #1:  None

Laceration Extension #1:  N/A

Laceration Repair:  Not Applicable

Sponge Count Correct:  N/A

Sharps Count Correct:  N/A

   

=================================================================

CSECTION DELIVERY

=================================================================

   

Primary Indication:  Failure of Descent

Secondary Indication:  N/A

CSection Urgency:  Non-Scheduled

CSection Incidence:  Primary

Labor:  Labor

Elective:  Nonelective

CSection Incision:  Lower Uterine Transverse

   

=================================================================

BABY A INFORMATION

=================================================================

   

Infant Delivery Date/Time:  2018 00:53

Method of Delivery:  

Born in Route :  No

:  N/A

Forceps:  N/A

Vacuum Extraction:  N/A

Shoulder Dystocia :  No

   

=================================================================

PRESENTATION/POSITION BABY A

=================================================================

   

Presentation:  Cephalic

Cephalic Presentation:  Vertex

Breech Presentation:  N/A

   

=================================================================

PLACENTA INFORMATION BABY A

=================================================================

   

Placenta Delivery Time :  2018 00:55

Placenta Method of Delivery:  Manual Removal

Placenta Status:  Delivered

   

=================================================================

APGAR SCORES BABY A

=================================================================

   

Heart Rate 1 min:  >100 bpm

Resp Effort 1 min:  Good Cry

Reflex Irritability 1 min:  Cough or Sneeze or Pulls Away

Muscle Tone 1 min:  Active Motion

Color 1 min:  Blue/Pale

Resuscitation Effort 1 min:  Tactile Stimulation

APGAR SCORE 1 MIN:  8

Heart Rate 5 min:  >100 bpm

Resp Effort 5 min:  Good Cry

Reflex Irritability 5 min:  Cough or Sneeze or Pulls Away

Muscle Tone 5 min:  Active Motion

Color 5 min:  Body Pink, Extremities Blue

Resuscitation Effort 5 min:  Tactile Stimulation

APGAR SCORE 5 MIN:  9

   

=================================================================

INFANT INFORMATION BABY A

=================================================================

   

Gestational Age at Delivery:  36.6

Gestational Status:  Late - 34- 36.6 Weeks

Infant Outcome :  Liveborn

Infant Condition :  Stable

Infant Sex:  Male

   

=================================================================

IDENTIFICATION BABY A

=================================================================

   

Infant Verification Date/Time:  2018 01:01

ID Band Number:  [id number]

Mother's Name Verified:  Yes

Infant Medical Record Number:  [id number]

RN Verifying Infant:  SSean Ceballos, RNC _ ESean Whitehead, RN

   

=================================================================

WEIGHT/LENGTH BABY A

=================================================================

   

Infant Birthweight (gm):  3180

Infant Weight (lb):  7

Infant Weight (oz):  0

Infant Length (in):  21.00

Infant Length (cm):  53.34

   

=================================================================

CORD INFORMATION BABY A

=================================================================

   

No. Cord Vessels:  3

Nuchal Cord :  N/A

Cord Blood Taken:  Yes-For Eval (Mom's Blood Type - or O+)

Infant Suction:  Mouth; Nose

   

=================================================================

ASSESSMENT BABY A

=================================================================

   

Skin to Skin:  Yes

   

=================================================================

BABY B INFORMATION

=================================================================

   

 :  N/A

   

=================================================================

SIGNATURES

=================================================================

   

:  I was personally available for consultation and serving as

   supervising physician for the MLP.

## 2018-02-15 NOTE — PDOC PROGRESS REPORT
Subjective-OB


Progress Note for:: 02/15/18


Subjective: 





33yo G4 now P4 s/p primary  with tubal ligation ppd1. Ambulating, 

voiding and passing flatus without difficulty. Denies headache, visual 

disturbances or other concerns.





Physical Exam (OB)


Vital Signs: 


 











Temp Pulse Resp BP Pulse Ox


 


 98.6 F   96   18   122/73   100 


 


 02/15/18 12:32  02/15/18 12:32  02/15/18 12:32  02/15/18 12:32  02/15/18 12:32














- General


General Appearance: Appears well


In distress: None





- PIH/Pre-Eclampsia


DTR's: 2 +


Clonus: Negative


Headache: Absent


Epigastric Pain: No


Visual Changes: No





- 


Dressing Removed: No - opsite dressing D&I, no redness or swelling, no new 

drainage


Incision: Well Approximated





- Bilateral Tubal Ligation


Dressing Removed: No - opsite





- Lochia


Lochia Amount: Scant < 10 ml


Lochia Color: Rubra/Red





- Abdomen


Description: Soft


Hernia Present: No


Fundal Description: Firm, Midline


Fundal Height: u/u - u/2





- Respiratory


Respiratory Status: No respiratory distress





- Extremities


Upper extremity: Normal inspection


Lower extremities: Normal inspection





- Neurological


Cognition: Normal


Orientation: AAOx4





- Psychological


Associated symptoms: Normal affect, Normal mood





Objective-Diagnostic


Laboratory: 


 





 02/15/18 08:12 





 18 11:13 





 











  02/15/18





  08:12


 


WBC  9.8


 


RBC  2.90 L


 


Hgb  9.1 L


 


Hct  26.6 L


 


MCV  92


 


MCH  31.2


 


MCHC  34.1


 


RDW  14.0


 


Plt Count  196














Assessment and Plan(PN)





- Assessment and Plan


(1) Acute blood loss anemia


Is this a current diagnosis for this admission?: Yes   


Plan: 


increase iron in diet and feso4 BID








(2) Carrier of group B Streptococcus


Is this a current diagnosis for this admission?: Yes   


Plan: 


delivered








(3) Chronic hypertension with superimposed preeclampsia


Is this a current diagnosis for this admission?: Yes   


Plan: 


continue to monitor for s/s of postpartum super imposed pre-e








(4) Chronic hypertension affecting pregnancy


Is this a current diagnosis for this admission?: Yes   


Plan: 


continue to monitor for worsening s/s 








(5) Status post primary low transverse  section


Is this a current diagnosis for this admission?: Yes   


Plan: 


routine pp care








(6) Admission for sterilization


Is this a current diagnosis for this admission?: Yes   


Plan: 


routine pp care








(7)  delivery


Is this a current diagnosis for this admission?: Yes   


Plan: 


routine pp care








- Time Spent with Patient


Time with patient: Less than 15 minutes


Medications reviewed and adjusted accordingly: Yes





- Disposition


Anticipated Discharge: Home


Within: within 24 hours

## 2018-02-16 VITALS — SYSTOLIC BLOOD PRESSURE: 134 MMHG | DIASTOLIC BLOOD PRESSURE: 86 MMHG

## 2018-02-16 RX ADMIN — OXYCODONE AND ACETAMINOPHEN PRN TAB: 5; 325 TABLET ORAL at 08:50

## 2018-02-16 RX ADMIN — SIMETHICONE PRN MG: 80 TABLET, CHEWABLE ORAL at 10:07

## 2018-02-16 RX ADMIN — Medication SCH CAP: at 10:07

## 2018-02-16 RX ADMIN — LABETALOL HYDROCHLORIDE SCH MG: 200 TABLET, FILM COATED ORAL at 05:34

## 2018-02-16 RX ADMIN — DOCUSATE SODIUM SCH MG: 100 CAPSULE, LIQUID FILLED ORAL at 10:07

## 2018-02-16 NOTE — PDOC DISCHARGE SUMMARY
Final Diagnosis


Discharge Date: 18





- Final Diagnosis


(1) Acute blood loss anemia


Is this a current diagnosis for this admission?: Yes   





(2) Admission for sterilization


Is this a current diagnosis for this admission?: Yes   





(3) Carrier of group B Streptococcus


Is this a current diagnosis for this admission?: Yes   





(4) Chronic hypertension affecting pregnancy


Is this a current diagnosis for this admission?: Yes   





(5) Chronic hypertension with superimposed preeclampsia


Is this a current diagnosis for this admission?: Yes   





(6)  delivery


Is this a current diagnosis for this admission?: Yes   





(7) Status post primary low transverse  section


Is this a current diagnosis for this admission?: Yes   





Discharge Data





- Discharge Medication


Prescriptions: 


Oxycodone HCl/Acetaminophen [Percocet 5-325 mg Tablet] 2 tab PO Q4HP PRN #30 

tablet


 PRN Reason: 


Docusate Sodium [Colace 100 mg Capsule] 100 mg PO BID #60 capsule


Ferrous Sulfate 325 mg PO BID #60 tablet


Ibuprofen [Motrin 800 mg Tablet] 800 mg PO Q6 #60 tablet


Labetalol HCl [Normodyne 200 mg Tablet] 200 mg PO Q12A #60 tablet


Home Medications: 








Docusate Sodium [Colace 100 mg Capsule] 100 mg PO BID #60 capsule 18 


Ferrous Sulfate 325 mg PO BID #60 tablet 18 


Ibuprofen [Motrin 800 mg Tablet] 800 mg PO Q6 #60 tablet 18 


Labetalol HCl [Normodyne 200 mg Tablet] 200 mg PO Q12A #60 tablet 18 


Oxycodone HCl/Acetaminophen [Percocet 5-325 mg Tablet] 2 tab PO Q4HP PRN #30 

tablet 18 








Gestational Age: 36.6


Reason(s) for Admission: Induction of Labor, PIH, Group B Strep Positive


Prenatal Procedures: NST


Intrapartum Procedure(s): : Low Cervical, Transverse, Tubal Ligation





-  Data


  ** Baby 1 Male


Apgar at 1 minute: 8


Apgar at 5 minutes: 9


Weight: 3180 kg


Home with Mother: Yes


Complications: Yes - , failed iol





- Diagnosis Test


Laboratory: 


 











Temp Pulse Resp BP Pulse Ox


 


 98.2 F   102 H  16   137/85 H  100 


 


 18 07:36  18 07:36  18 07:36  18 07:36  18 07:36








 











  18





  10:52 11:48 06:45


 


RBC   3.57 L  3.93


 


Hgb   11.1 L  12.2


 


Hct   32.5 L  35.5 L


 


Urine Opiates Screen  NEGATIVE  














  02/14/18 02/15/18





  11:13 08:12


 


RBC  3.05 L  2.90 L


 


Hgb  9.6 L D  9.1 L


 


Hct  28.3 L  26.6 L


 


Urine Opiates Screen  














- Discharge information/Instructions


Discharge Activity: Activity As Tolerated, Balance Activity w/Rest, No Driving, 

No Lifting Over 10 Pounds, Pelvic Rest, No tub bath


Discharge Diet: Regular


Disposition: HOME, SELF-CARE


Follow up with: [facility]


in: 1, Weeks